# Patient Record
Sex: FEMALE | Race: WHITE | NOT HISPANIC OR LATINO
[De-identification: names, ages, dates, MRNs, and addresses within clinical notes are randomized per-mention and may not be internally consistent; named-entity substitution may affect disease eponyms.]

---

## 2017-02-17 ENCOUNTER — APPOINTMENT (OUTPATIENT)
Dept: RADIOLOGY | Facility: CLINIC | Age: 52
End: 2017-02-17

## 2017-02-17 ENCOUNTER — APPOINTMENT (OUTPATIENT)
Dept: ULTRASOUND IMAGING | Facility: CLINIC | Age: 52
End: 2017-02-17

## 2017-02-17 ENCOUNTER — OUTPATIENT (OUTPATIENT)
Dept: OUTPATIENT SERVICES | Facility: HOSPITAL | Age: 52
LOS: 1 days | End: 2017-02-17
Payer: COMMERCIAL

## 2017-02-17 DIAGNOSIS — E04.9 NONTOXIC GOITER, UNSPECIFIED: ICD-10-CM

## 2017-02-17 DIAGNOSIS — M85.80 OTHER SPECIFIED DISORDERS OF BONE DENSITY AND STRUCTURE, UNSPECIFIED SITE: ICD-10-CM

## 2017-02-17 PROCEDURE — 76536 US EXAM OF HEAD AND NECK: CPT

## 2017-02-17 PROCEDURE — 77080 DXA BONE DENSITY AXIAL: CPT

## 2017-02-23 ENCOUNTER — TRANSCRIPTION ENCOUNTER (OUTPATIENT)
Age: 52
End: 2017-02-23

## 2017-02-23 DIAGNOSIS — M85.9 DISORDER OF BONE DENSITY AND STRUCTURE, UNSPECIFIED: ICD-10-CM

## 2017-02-23 DIAGNOSIS — E06.3 AUTOIMMUNE THYROIDITIS: ICD-10-CM

## 2017-04-20 ENCOUNTER — RESULT REVIEW (OUTPATIENT)
Age: 52
End: 2017-04-20

## 2017-04-27 ENCOUNTER — RESULT REVIEW (OUTPATIENT)
Age: 52
End: 2017-04-27

## 2017-11-10 ENCOUNTER — OUTPATIENT (OUTPATIENT)
Dept: OUTPATIENT SERVICES | Facility: HOSPITAL | Age: 52
LOS: 1 days | End: 2017-11-10
Payer: COMMERCIAL

## 2017-11-10 ENCOUNTER — APPOINTMENT (OUTPATIENT)
Dept: MRI IMAGING | Facility: CLINIC | Age: 52
End: 2017-11-10
Payer: COMMERCIAL

## 2017-11-10 DIAGNOSIS — R42 DIZZINESS AND GIDDINESS: ICD-10-CM

## 2017-11-10 PROCEDURE — 70551 MRI BRAIN STEM W/O DYE: CPT | Mod: 26

## 2017-11-10 PROCEDURE — 70551 MRI BRAIN STEM W/O DYE: CPT

## 2018-02-22 ENCOUNTER — APPOINTMENT (OUTPATIENT)
Dept: NEUROLOGY | Facility: CLINIC | Age: 53
End: 2018-02-22
Payer: COMMERCIAL

## 2018-02-22 ENCOUNTER — FORM ENCOUNTER (OUTPATIENT)
Age: 53
End: 2018-02-22

## 2018-02-22 VITALS
WEIGHT: 150 LBS | DIASTOLIC BLOOD PRESSURE: 81 MMHG | SYSTOLIC BLOOD PRESSURE: 123 MMHG | HEART RATE: 78 BPM | BODY MASS INDEX: 26.58 KG/M2 | HEIGHT: 63 IN

## 2018-02-22 DIAGNOSIS — Z82.49 FAMILY HISTORY OF ISCHEMIC HEART DISEASE AND OTHER DISEASES OF THE CIRCULATORY SYSTEM: ICD-10-CM

## 2018-02-22 DIAGNOSIS — Z83.3 FAMILY HISTORY OF DIABETES MELLITUS: ICD-10-CM

## 2018-02-22 DIAGNOSIS — Z86.39 PERSONAL HISTORY OF OTHER ENDOCRINE, NUTRITIONAL AND METABOLIC DISEASE: ICD-10-CM

## 2018-02-22 DIAGNOSIS — Z82.5 FAMILY HISTORY OF ASTHMA AND OTHER CHRONIC LOWER RESPIRATORY DISEASES: ICD-10-CM

## 2018-02-22 DIAGNOSIS — Z78.9 OTHER SPECIFIED HEALTH STATUS: ICD-10-CM

## 2018-02-22 DIAGNOSIS — Z87.891 PERSONAL HISTORY OF NICOTINE DEPENDENCE: ICD-10-CM

## 2018-02-22 DIAGNOSIS — G57.50 TARSAL TUNNEL SYNDROME, UNSPECIFIED LOWER LIMB: ICD-10-CM

## 2018-02-22 DIAGNOSIS — G56.03 CARPAL TUNNEL SYNDROM,BILATERAL UPPER LIMBS: ICD-10-CM

## 2018-02-22 PROCEDURE — 99204 OFFICE O/P NEW MOD 45 MIN: CPT

## 2018-02-22 RX ORDER — UBIDECARENONE/VIT E ACET 100MG-5
25 MCG CAPSULE ORAL
Refills: 0 | Status: ACTIVE | COMMUNITY

## 2018-02-23 ENCOUNTER — OUTPATIENT (OUTPATIENT)
Dept: OUTPATIENT SERVICES | Facility: HOSPITAL | Age: 53
LOS: 1 days | End: 2018-02-23
Payer: COMMERCIAL

## 2018-02-23 ENCOUNTER — APPOINTMENT (OUTPATIENT)
Dept: MRI IMAGING | Facility: CLINIC | Age: 53
End: 2018-02-23
Payer: COMMERCIAL

## 2018-02-23 DIAGNOSIS — Z00.8 ENCOUNTER FOR OTHER GENERAL EXAMINATION: ICD-10-CM

## 2018-02-23 PROCEDURE — 72141 MRI NECK SPINE W/O DYE: CPT | Mod: 26

## 2018-02-23 PROCEDURE — 72141 MRI NECK SPINE W/O DYE: CPT

## 2018-03-01 LAB
CRP SERPL-MCNC: 0.5 MG/DL
THYROGLOB AB SERPL-ACNC: <20 IU/ML
THYROPEROXIDASE AB SERPL IA-ACNC: 77.5 IU/ML

## 2018-04-13 ENCOUNTER — APPOINTMENT (OUTPATIENT)
Dept: OPHTHALMOLOGY | Facility: CLINIC | Age: 53
End: 2018-04-13
Payer: COMMERCIAL

## 2018-04-13 DIAGNOSIS — Z78.9 OTHER SPECIFIED HEALTH STATUS: ICD-10-CM

## 2018-04-13 DIAGNOSIS — Z83.518 FAMILY HISTORY OF OTHER SPECIFIED EYE DISORDER: ICD-10-CM

## 2018-04-13 DIAGNOSIS — G43.109 MIGRAINE WITH AURA, NOT INTRACTABLE, W/OUT STATUS MIGRAINOSUS: ICD-10-CM

## 2018-04-13 PROCEDURE — 99204 OFFICE O/P NEW MOD 45 MIN: CPT

## 2018-04-13 PROCEDURE — 92083 EXTENDED VISUAL FIELD XM: CPT

## 2018-04-20 ENCOUNTER — APPOINTMENT (OUTPATIENT)
Dept: NEUROLOGY | Facility: CLINIC | Age: 53
End: 2018-04-20
Payer: COMMERCIAL

## 2018-04-20 DIAGNOSIS — R20.2 PARESTHESIA OF SKIN: ICD-10-CM

## 2018-04-20 PROCEDURE — 95911 NRV CNDJ TEST 9-10 STUDIES: CPT

## 2018-04-20 PROCEDURE — 95886 MUSC TEST DONE W/N TEST COMP: CPT

## 2018-05-02 ENCOUNTER — RESULT REVIEW (OUTPATIENT)
Age: 53
End: 2018-05-02

## 2018-05-10 ENCOUNTER — APPOINTMENT (OUTPATIENT)
Dept: NEUROLOGY | Facility: CLINIC | Age: 53
End: 2018-05-10

## 2018-06-01 ENCOUNTER — APPOINTMENT (OUTPATIENT)
Dept: OTOLARYNGOLOGY | Facility: CLINIC | Age: 53
End: 2018-06-01
Payer: COMMERCIAL

## 2018-06-01 VITALS
DIASTOLIC BLOOD PRESSURE: 70 MMHG | WEIGHT: 147 LBS | BODY MASS INDEX: 26.05 KG/M2 | HEART RATE: 81 BPM | HEIGHT: 63 IN | SYSTOLIC BLOOD PRESSURE: 112 MMHG

## 2018-06-01 DIAGNOSIS — G43.909 MIGRAINE, UNSPECIFIED, NOT INTRACTABLE, W/OUT STATUS MIGRAINOSUS: ICD-10-CM

## 2018-06-01 PROCEDURE — 92550 TYMPANOMETRY & REFLEX THRESH: CPT

## 2018-06-01 PROCEDURE — 92557 COMPREHENSIVE HEARING TEST: CPT

## 2018-06-01 PROCEDURE — 99204 OFFICE O/P NEW MOD 45 MIN: CPT | Mod: 25

## 2018-06-22 ENCOUNTER — OUTPATIENT (OUTPATIENT)
Dept: OUTPATIENT SERVICES | Facility: HOSPITAL | Age: 53
LOS: 1 days | End: 2018-06-22
Payer: COMMERCIAL

## 2018-06-22 VITALS
RESPIRATION RATE: 16 BRPM | HEART RATE: 82 BPM | OXYGEN SATURATION: 99 % | TEMPERATURE: 98 F | HEIGHT: 64.5 IN | DIASTOLIC BLOOD PRESSURE: 86 MMHG | WEIGHT: 147.05 LBS | SYSTOLIC BLOOD PRESSURE: 120 MMHG

## 2018-06-22 DIAGNOSIS — Z98.890 OTHER SPECIFIED POSTPROCEDURAL STATES: Chronic | ICD-10-CM

## 2018-06-22 DIAGNOSIS — E03.9 HYPOTHYROIDISM, UNSPECIFIED: ICD-10-CM

## 2018-06-22 DIAGNOSIS — J45.909 UNSPECIFIED ASTHMA, UNCOMPLICATED: ICD-10-CM

## 2018-06-22 DIAGNOSIS — H80.93 UNSPECIFIED OTOSCLEROSIS, BILATERAL: ICD-10-CM

## 2018-06-22 DIAGNOSIS — Z86.69 PERSONAL HISTORY OF OTHER DISEASES OF THE NERVOUS SYSTEM AND SENSE ORGANS: Chronic | ICD-10-CM

## 2018-06-22 LAB
BUN SERPL-MCNC: 13 MG/DL — SIGNIFICANT CHANGE UP (ref 7–23)
CALCIUM SERPL-MCNC: 9.2 MG/DL — SIGNIFICANT CHANGE UP (ref 8.4–10.5)
CHLORIDE SERPL-SCNC: 101 MMOL/L — SIGNIFICANT CHANGE UP (ref 98–107)
CO2 SERPL-SCNC: 26 MMOL/L — SIGNIFICANT CHANGE UP (ref 22–31)
CREAT SERPL-MCNC: 0.74 MG/DL — SIGNIFICANT CHANGE UP (ref 0.5–1.3)
GLUCOSE SERPL-MCNC: 71 MG/DL — SIGNIFICANT CHANGE UP (ref 70–99)
HCT VFR BLD CALC: 43.3 % — SIGNIFICANT CHANGE UP (ref 34.5–45)
HGB BLD-MCNC: 14.8 G/DL — SIGNIFICANT CHANGE UP (ref 11.5–15.5)
MCHC RBC-ENTMCNC: 34.2 % — SIGNIFICANT CHANGE UP (ref 32–36)
MCHC RBC-ENTMCNC: 34.3 PG — HIGH (ref 27–34)
MCV RBC AUTO: 100.2 FL — HIGH (ref 80–100)
NRBC # FLD: 0 — SIGNIFICANT CHANGE UP
PLATELET # BLD AUTO: 185 K/UL — SIGNIFICANT CHANGE UP (ref 150–400)
PMV BLD: 9.7 FL — SIGNIFICANT CHANGE UP (ref 7–13)
POTASSIUM SERPL-MCNC: 4 MMOL/L — SIGNIFICANT CHANGE UP (ref 3.5–5.3)
POTASSIUM SERPL-SCNC: 4 MMOL/L — SIGNIFICANT CHANGE UP (ref 3.5–5.3)
RBC # BLD: 4.32 M/UL — SIGNIFICANT CHANGE UP (ref 3.8–5.2)
RBC # FLD: 12.6 % — SIGNIFICANT CHANGE UP (ref 10.3–14.5)
SODIUM SERPL-SCNC: 139 MMOL/L — SIGNIFICANT CHANGE UP (ref 135–145)
WBC # BLD: 3.82 K/UL — SIGNIFICANT CHANGE UP (ref 3.8–10.5)
WBC # FLD AUTO: 3.82 K/UL — SIGNIFICANT CHANGE UP (ref 3.8–10.5)

## 2018-06-22 PROCEDURE — 93010 ELECTROCARDIOGRAM REPORT: CPT

## 2018-06-22 NOTE — H&P PST ADULT - LYMPHATIC
posterior cervical L/anterior cervical L/supraclavicular R/posterior cervical R/anterior cervical R/supraclavicular L

## 2018-06-22 NOTE — H&P PST ADULT - FAMILY HISTORY
Mother  Still living? No  Family history of diabetes mellitus, Age at diagnosis: Age Unknown  Family history of aortic stenosis, Age at diagnosis: Age Unknown  Family history of arrhythmia, Age at diagnosis: Age Unknown  Family history of pacemaker, Age at diagnosis: Age Unknown     Father  Still living? No  Family history of myocardial infarction, Age at diagnosis: Age Unknown  Family history of cerebrovascular accident (CVA), Age at diagnosis: Age Unknown     Sibling  Still living? Yes, Estimated age: 61-70  Family history of breast cancer, Age at diagnosis: Age Unknown  Family history of melanoma, Age at diagnosis: Age Unknown

## 2018-06-22 NOTE — H&P PST ADULT - PROBLEM SELECTOR PLAN 3
lungs clear , O2 sat 99 % on room air . Pt instructed to take inhaler as prescribed and bring to hospital .

## 2018-06-22 NOTE — H&P PST ADULT - RS GEN PE MLT RESP DETAILS PC
respirations non-labored/good air movement/no rales/no wheezes/clear to auscultation bilaterally/no rhonchi/breath sounds equal

## 2018-06-22 NOTE — H&P PST ADULT - HISTORY OF PRESENT ILLNESS
This is  a 53 y.o. female who presented to Dr Tariq complaining of being hard of hearing  , hearing test done . Pt has unspecified otosclerosis , bilateral . Pt now for surgery .

## 2018-06-22 NOTE — H&P PST ADULT - MUSCULOSKELETAL
detailed exam no joint warmth/no calf tenderness/ROM intact/no joint swelling/no joint erythema details…

## 2018-06-22 NOTE — H&P PST ADULT - ATTENDING COMMENTS
Physician Assistant Statement 07-20-18  I have personally seen and interviewed the patient. There have NOT been any changes in the patient's history or review of systems since PMD visit on 7/13/18.

## 2018-06-22 NOTE — H&P PST ADULT - NSANTHOSAYNRD_GEN_A_CORE
No. BRISSA screening performed.  STOP BANG Legend: 0-2 = LOW Risk; 3-4 = INTERMEDIATE Risk; 5-8 = HIGH Risk

## 2018-07-02 ENCOUNTER — TRANSCRIPTION ENCOUNTER (OUTPATIENT)
Age: 53
End: 2018-07-02

## 2018-07-03 ENCOUNTER — APPOINTMENT (OUTPATIENT)
Dept: OTOLARYNGOLOGY | Facility: AMBULATORY SURGERY CENTER | Age: 53
End: 2018-07-03

## 2018-07-03 ENCOUNTER — RESULT REVIEW (OUTPATIENT)
Age: 53
End: 2018-07-03

## 2018-07-03 ENCOUNTER — OUTPATIENT (OUTPATIENT)
Dept: OUTPATIENT SERVICES | Facility: HOSPITAL | Age: 53
LOS: 1 days | Discharge: HOME | End: 2018-07-03
Payer: COMMERCIAL

## 2018-07-03 VITALS
RESPIRATION RATE: 16 BRPM | OXYGEN SATURATION: 98 % | HEART RATE: 72 BPM | TEMPERATURE: 98 F | DIASTOLIC BLOOD PRESSURE: 42 MMHG | SYSTOLIC BLOOD PRESSURE: 111 MMHG | HEIGHT: 64.5 IN | WEIGHT: 147.05 LBS

## 2018-07-03 VITALS — RESPIRATION RATE: 14 BRPM | TEMPERATURE: 98 F | OXYGEN SATURATION: 99 % | HEART RATE: 82 BPM

## 2018-07-03 DIAGNOSIS — H80.93 UNSPECIFIED OTOSCLEROSIS, BILATERAL: ICD-10-CM

## 2018-07-03 DIAGNOSIS — Z86.69 PERSONAL HISTORY OF OTHER DISEASES OF THE NERVOUS SYSTEM AND SENSE ORGANS: Chronic | ICD-10-CM

## 2018-07-03 DIAGNOSIS — Z98.890 OTHER SPECIFIED POSTPROCEDURAL STATES: Chronic | ICD-10-CM

## 2018-07-03 PROCEDURE — 88304 TISSUE EXAM BY PATHOLOGIST: CPT | Mod: 26

## 2018-07-03 PROCEDURE — 88311 DECALCIFY TISSUE: CPT | Mod: 26

## 2018-07-03 PROCEDURE — 20926: CPT | Mod: LT

## 2018-07-03 PROCEDURE — 69660 REVISE MIDDLE EAR BONE: CPT | Mod: LT

## 2018-07-03 NOTE — ASU DISCHARGE PLAN (ADULT/PEDIATRIC). - NOTIFY
Bleeding that does not stop Bleeding that does not stop/Persistent Nausea and Vomiting/Fever greater than 101/Inability to Tolerate Liquids or Foods

## 2018-07-05 ENCOUNTER — MESSAGE (OUTPATIENT)
Age: 53
End: 2018-07-05

## 2018-07-09 LAB — SURGICAL PATHOLOGY STUDY: SIGNIFICANT CHANGE UP

## 2018-07-10 ENCOUNTER — OUTPATIENT (OUTPATIENT)
Dept: OUTPATIENT SERVICES | Facility: HOSPITAL | Age: 53
LOS: 1 days | End: 2018-07-10
Payer: COMMERCIAL

## 2018-07-10 VITALS
TEMPERATURE: 98 F | HEART RATE: 77 BPM | SYSTOLIC BLOOD PRESSURE: 100 MMHG | OXYGEN SATURATION: 98 % | HEIGHT: 64 IN | WEIGHT: 142.42 LBS | RESPIRATION RATE: 16 BRPM | DIASTOLIC BLOOD PRESSURE: 61 MMHG

## 2018-07-10 VITALS — HEIGHT: 64 IN | WEIGHT: 142.42 LBS

## 2018-07-10 DIAGNOSIS — D48.1 NEOPLASM OF UNCERTAIN BEHAVIOR OF CONNECTIVE AND OTHER SOFT TISSUE: ICD-10-CM

## 2018-07-10 DIAGNOSIS — Z01.818 ENCOUNTER FOR OTHER PREPROCEDURAL EXAMINATION: ICD-10-CM

## 2018-07-10 DIAGNOSIS — M24.574 CONTRACTURE, RIGHT FOOT: ICD-10-CM

## 2018-07-10 DIAGNOSIS — Z98.890 OTHER SPECIFIED POSTPROCEDURAL STATES: Chronic | ICD-10-CM

## 2018-07-10 DIAGNOSIS — M21.961 UNSPECIFIED ACQUIRED DEFORMITY OF RIGHT LOWER LEG: ICD-10-CM

## 2018-07-10 DIAGNOSIS — M20.11 HALLUX VALGUS (ACQUIRED), RIGHT FOOT: ICD-10-CM

## 2018-07-10 DIAGNOSIS — M20.5X1 OTHER DEFORMITIES OF TOE(S) (ACQUIRED), RIGHT FOOT: ICD-10-CM

## 2018-07-10 DIAGNOSIS — Z86.69 PERSONAL HISTORY OF OTHER DISEASES OF THE NERVOUS SYSTEM AND SENSE ORGANS: Chronic | ICD-10-CM

## 2018-07-10 PROCEDURE — G0463: CPT

## 2018-07-10 RX ORDER — SODIUM CHLORIDE 9 MG/ML
1000 INJECTION, SOLUTION INTRAVENOUS
Qty: 0 | Refills: 0 | Status: DISCONTINUED | OUTPATIENT
Start: 2018-07-20 | End: 2018-07-20

## 2018-07-10 NOTE — H&P PST ADULT - PSH
H/O ear disorder  right  stapedectomy - 3/93  S/P ear surgery  Stapedectomy 7/3/18  S/P left knee arthroscopy  7/09

## 2018-07-10 NOTE — H&P PST ADULT - PMH
Asthma    Deformity of foot, right    Hiatal hernia    Hypothyroid  last TFT's 6 weeks ago - normal  Otosclerosis of both ears    Vertigo

## 2018-07-10 NOTE — H&P PST ADULT - ATTENDING COMMENTS
I have discussed review of systems and patient's history and there have been no changes in medical status since seen by PMD.  documented by Patricia Sanchez R-PAC on 7/20/18  at approx 12: 15 pm

## 2018-07-10 NOTE — H&P PST ADULT - RS GEN PE MLT RESP DETAILS PC
clear to auscultation bilaterally/good air movement/airway patent/breath sounds equal/normal/respirations non-labored

## 2018-07-10 NOTE — H&P PST ADULT - HISTORY OF PRESENT ILLNESS
54 y/o female presents for PST. As per patient she developed deformities to right foot about 3 yrs ago but over the past one year it has been getting progressively worst causing pain and discomfort with ambulation and footwear. Pain and discomfort is relieved by resting.

## 2018-07-10 NOTE — H&P PST ADULT - FAMILY HISTORY
Mother  Still living? No  Family history of diabetes mellitus, Age at diagnosis: Age Unknown  Family history of aortic stenosis, Age at diagnosis: Age Unknown  Family history of arrhythmia, Age at diagnosis: Age Unknown  Family history of pacemaker, Age at diagnosis: Age Unknown     Father  Still living? No  Family history of myocardial infarction, Age at diagnosis: Age Unknown  Family history of cerebrovascular accident (CVA), Age at diagnosis: Age Unknown  Family history of Parkinson disease, Age at diagnosis: Age Unknown     Sibling  Still living? Yes, Estimated age: 61-70  Family history of breast cancer, Age at diagnosis: Age Unknown  Family history of melanoma, Age at diagnosis: Age Unknown

## 2018-07-13 ENCOUNTER — APPOINTMENT (OUTPATIENT)
Dept: OTOLARYNGOLOGY | Facility: CLINIC | Age: 53
End: 2018-07-13
Payer: COMMERCIAL

## 2018-07-13 VITALS
SYSTOLIC BLOOD PRESSURE: 105 MMHG | HEIGHT: 63 IN | HEART RATE: 70 BPM | DIASTOLIC BLOOD PRESSURE: 65 MMHG | WEIGHT: 141 LBS | BODY MASS INDEX: 24.98 KG/M2

## 2018-07-13 PROCEDURE — 99024 POSTOP FOLLOW-UP VISIT: CPT

## 2018-07-19 ENCOUNTER — TRANSCRIPTION ENCOUNTER (OUTPATIENT)
Age: 53
End: 2018-07-19

## 2018-07-20 ENCOUNTER — RESULT REVIEW (OUTPATIENT)
Age: 53
End: 2018-07-20

## 2018-07-20 ENCOUNTER — OUTPATIENT (OUTPATIENT)
Dept: OUTPATIENT SERVICES | Facility: HOSPITAL | Age: 53
LOS: 1 days | End: 2018-07-20
Payer: COMMERCIAL

## 2018-07-20 VITALS
SYSTOLIC BLOOD PRESSURE: 112 MMHG | OXYGEN SATURATION: 99 % | TEMPERATURE: 98 F | DIASTOLIC BLOOD PRESSURE: 74 MMHG | HEART RATE: 65 BPM | RESPIRATION RATE: 14 BRPM

## 2018-07-20 VITALS
HEART RATE: 89 BPM | HEIGHT: 64 IN | DIASTOLIC BLOOD PRESSURE: 74 MMHG | OXYGEN SATURATION: 97 % | TEMPERATURE: 98 F | SYSTOLIC BLOOD PRESSURE: 108 MMHG | RESPIRATION RATE: 14 BRPM | WEIGHT: 142.42 LBS

## 2018-07-20 DIAGNOSIS — M24.574 CONTRACTURE, RIGHT FOOT: ICD-10-CM

## 2018-07-20 DIAGNOSIS — Z98.890 OTHER SPECIFIED POSTPROCEDURAL STATES: Chronic | ICD-10-CM

## 2018-07-20 DIAGNOSIS — M20.41 OTHER HAMMER TOE(S) (ACQUIRED), RIGHT FOOT: ICD-10-CM

## 2018-07-20 DIAGNOSIS — D48.1 NEOPLASM OF UNCERTAIN BEHAVIOR OF CONNECTIVE AND OTHER SOFT TISSUE: ICD-10-CM

## 2018-07-20 DIAGNOSIS — Z86.69 PERSONAL HISTORY OF OTHER DISEASES OF THE NERVOUS SYSTEM AND SENSE ORGANS: Chronic | ICD-10-CM

## 2018-07-20 DIAGNOSIS — M20.11 HALLUX VALGUS (ACQUIRED), RIGHT FOOT: ICD-10-CM

## 2018-07-20 DIAGNOSIS — M20.5X1 OTHER DEFORMITIES OF TOE(S) (ACQUIRED), RIGHT FOOT: ICD-10-CM

## 2018-07-20 PROCEDURE — 73620 X-RAY EXAM OF FOOT: CPT

## 2018-07-20 PROCEDURE — 88311 DECALCIFY TISSUE: CPT | Mod: 26

## 2018-07-20 PROCEDURE — 88305 TISSUE EXAM BY PATHOLOGIST: CPT | Mod: 26

## 2018-07-20 PROCEDURE — 88305 TISSUE EXAM BY PATHOLOGIST: CPT

## 2018-07-20 PROCEDURE — 28299 COR HLX VLGS DOUBLE OSTEOT: CPT | Mod: RT

## 2018-07-20 PROCEDURE — 11420 EXC H-F-NK-SP B9+MARG 0.5/<: CPT | Mod: XS

## 2018-07-20 PROCEDURE — 88304 TISSUE EXAM BY PATHOLOGIST: CPT

## 2018-07-20 PROCEDURE — 73620 X-RAY EXAM OF FOOT: CPT | Mod: 26,RT

## 2018-07-20 PROCEDURE — 28272 RELEASE OF TOE JOINT EACH: CPT | Mod: T9

## 2018-07-20 PROCEDURE — 28270 RELEASE OF FOOT CONTRACTURE: CPT | Mod: XS,RT

## 2018-07-20 PROCEDURE — 88304 TISSUE EXAM BY PATHOLOGIST: CPT | Mod: 26

## 2018-07-20 PROCEDURE — 88311 DECALCIFY TISSUE: CPT

## 2018-07-20 PROCEDURE — C1713: CPT

## 2018-07-20 RX ORDER — OXYCODONE HYDROCHLORIDE 5 MG/1
5 TABLET ORAL EVERY 4 HOURS
Qty: 0 | Refills: 0 | Status: DISCONTINUED | OUTPATIENT
Start: 2018-07-20 | End: 2018-07-20

## 2018-07-20 RX ORDER — SODIUM CHLORIDE 9 MG/ML
1000 INJECTION, SOLUTION INTRAVENOUS
Qty: 0 | Refills: 0 | Status: DISCONTINUED | OUTPATIENT
Start: 2018-07-20 | End: 2018-07-20

## 2018-07-20 RX ORDER — ONDANSETRON 8 MG/1
4 TABLET, FILM COATED ORAL ONCE
Qty: 0 | Refills: 0 | Status: DISCONTINUED | OUTPATIENT
Start: 2018-07-20 | End: 2018-07-20

## 2018-07-20 RX ORDER — HYDROMORPHONE HYDROCHLORIDE 2 MG/ML
0.5 INJECTION INTRAMUSCULAR; INTRAVENOUS; SUBCUTANEOUS
Qty: 0 | Refills: 0 | Status: DISCONTINUED | OUTPATIENT
Start: 2018-07-20 | End: 2018-07-20

## 2018-07-20 RX ADMIN — SODIUM CHLORIDE 50 MILLILITER(S): 9 INJECTION, SOLUTION INTRAVENOUS at 10:38

## 2018-07-20 NOTE — BRIEF OPERATIVE NOTE - POST-OP DX
Hallux abducto valgus, right  07/20/2018  HAV/HT 2-5 right foot  STM right foot  Active  Dino Edwrads

## 2018-07-20 NOTE — ASU DISCHARGE PLAN (ADULT/PEDIATRIC). - CONDITIONS AT DISCHARGE
Pt stable.  Right foot drsg intact .  Surgical shoe in place.  Keep foot elevated Pt stable.  Right foot drsg intact .  Surgical shoe in place.

## 2018-07-20 NOTE — ASU DISCHARGE PLAN (ADULT/PEDIATRIC). - MEDICATION SUMMARY - MEDICATIONS TO TAKE
I will START or STAY ON the medications listed below when I get home from the hospital:    meclizine 30 mg oral tablet  -- orally 1 to 4 times a day, As Needed for vertigo  -- Indication: For As per physician    Ventolin HFA 90 mcg/inh inhalation aerosol  -- 2 puff(s) inhaled 4 times a day  -- Indication: For As per physician    Synthroid 88 mcg (0.088 mg) oral tablet  -- 1 tab(s) by mouth once a day  -- Indication: For As per physician    Vitamin B-12 500 mcg oral tablet  -- 1 tab(s) by mouth once a day  -- Indication: For As per physician    Vitamin D3 1000 intl units oral tablet  -- 1 tab(s) by mouth once a day  -- Indication: For As per physician

## 2018-07-20 NOTE — ASU DISCHARGE PLAN (ADULT/PEDIATRIC). - NOTIFY
Bleeding that does not stop/Pain not relieved by Medications/Fever greater than 101/Persistent Nausea and Vomiting

## 2018-07-20 NOTE — ASU PATIENT PROFILE, ADULT - ABILITY TO HEAR (WITH HEARING AID OR HEARING APPLIANCE IF NORMALLY USED):
Mildly to Moderately Impaired: difficulty hearing in some environments or speaker may need to increase volume or speak distinctly/Left ear packing

## 2018-07-20 NOTE — BRIEF OPERATIVE NOTE - PRE-OP DX
Hallux abducto valgus, right  07/20/2018  HAV / Hammertoe 2-5 right foot  STM right foot  Active  Dino Edwards

## 2018-07-25 LAB — SURGICAL PATHOLOGY STUDY: SIGNIFICANT CHANGE UP

## 2018-07-27 ENCOUNTER — TRANSCRIPTION ENCOUNTER (OUTPATIENT)
Age: 53
End: 2018-07-27

## 2018-07-31 PROBLEM — R42 DIZZINESS AND GIDDINESS: Chronic | Status: ACTIVE | Noted: 2018-07-10

## 2018-07-31 PROBLEM — M21.961 UNSPECIFIED ACQUIRED DEFORMITY OF RIGHT LOWER LEG: Chronic | Status: ACTIVE | Noted: 2018-07-10

## 2018-07-31 PROBLEM — H80.93 UNSPECIFIED OTOSCLEROSIS, BILATERAL: Chronic | Status: ACTIVE | Noted: 2018-07-10

## 2018-07-31 PROBLEM — K44.9 DIAPHRAGMATIC HERNIA WITHOUT OBSTRUCTION OR GANGRENE: Chronic | Status: ACTIVE | Noted: 2018-06-22

## 2018-07-31 PROBLEM — E03.9 HYPOTHYROIDISM, UNSPECIFIED: Chronic | Status: ACTIVE | Noted: 2018-06-22

## 2018-07-31 PROBLEM — J45.909 UNSPECIFIED ASTHMA, UNCOMPLICATED: Chronic | Status: ACTIVE | Noted: 2018-06-22

## 2018-08-01 ENCOUNTER — APPOINTMENT (OUTPATIENT)
Dept: ULTRASOUND IMAGING | Facility: CLINIC | Age: 53
End: 2018-08-01
Payer: COMMERCIAL

## 2018-08-01 ENCOUNTER — OUTPATIENT (OUTPATIENT)
Dept: OUTPATIENT SERVICES | Facility: HOSPITAL | Age: 53
LOS: 1 days | End: 2018-08-01
Payer: COMMERCIAL

## 2018-08-01 ENCOUNTER — APPOINTMENT (OUTPATIENT)
Dept: MAMMOGRAPHY | Facility: CLINIC | Age: 53
End: 2018-08-01
Payer: COMMERCIAL

## 2018-08-01 DIAGNOSIS — Z00.8 ENCOUNTER FOR OTHER GENERAL EXAMINATION: ICD-10-CM

## 2018-08-01 DIAGNOSIS — Z86.69 PERSONAL HISTORY OF OTHER DISEASES OF THE NERVOUS SYSTEM AND SENSE ORGANS: Chronic | ICD-10-CM

## 2018-08-01 DIAGNOSIS — Z98.890 OTHER SPECIFIED POSTPROCEDURAL STATES: Chronic | ICD-10-CM

## 2018-08-01 DIAGNOSIS — R92.2 INCONCLUSIVE MAMMOGRAM: ICD-10-CM

## 2018-08-01 DIAGNOSIS — Z12.31 ENCOUNTER FOR SCREENING MAMMOGRAM FOR MALIGNANT NEOPLASM OF BREAST: ICD-10-CM

## 2018-08-01 PROCEDURE — 77063 BREAST TOMOSYNTHESIS BI: CPT

## 2018-08-01 PROCEDURE — 77067 SCR MAMMO BI INCL CAD: CPT | Mod: 26

## 2018-08-01 PROCEDURE — 76641 ULTRASOUND BREAST COMPLETE: CPT | Mod: 26,50

## 2018-08-01 PROCEDURE — 77063 BREAST TOMOSYNTHESIS BI: CPT | Mod: 26

## 2018-08-01 PROCEDURE — 76641 ULTRASOUND BREAST COMPLETE: CPT

## 2018-08-01 PROCEDURE — 77067 SCR MAMMO BI INCL CAD: CPT

## 2018-08-17 ENCOUNTER — APPOINTMENT (OUTPATIENT)
Dept: NEUROLOGY | Facility: CLINIC | Age: 53
End: 2018-08-17

## 2018-08-17 ENCOUNTER — APPOINTMENT (OUTPATIENT)
Dept: OTOLARYNGOLOGY | Facility: CLINIC | Age: 53
End: 2018-08-17
Payer: COMMERCIAL

## 2018-08-17 VITALS
HEIGHT: 63 IN | HEART RATE: 78 BPM | SYSTOLIC BLOOD PRESSURE: 113 MMHG | WEIGHT: 141 LBS | DIASTOLIC BLOOD PRESSURE: 73 MMHG | BODY MASS INDEX: 24.98 KG/M2

## 2018-08-17 PROCEDURE — 92557 COMPREHENSIVE HEARING TEST: CPT

## 2018-08-17 PROCEDURE — 99024 POSTOP FOLLOW-UP VISIT: CPT

## 2018-11-16 ENCOUNTER — APPOINTMENT (OUTPATIENT)
Dept: OTOLARYNGOLOGY | Facility: CLINIC | Age: 53
End: 2018-11-16
Payer: COMMERCIAL

## 2018-11-16 VITALS
HEIGHT: 63 IN | HEART RATE: 79 BPM | WEIGHT: 141 LBS | SYSTOLIC BLOOD PRESSURE: 111 MMHG | DIASTOLIC BLOOD PRESSURE: 69 MMHG | BODY MASS INDEX: 24.98 KG/M2

## 2018-11-16 PROCEDURE — 92567 TYMPANOMETRY: CPT

## 2018-11-16 PROCEDURE — 92557 COMPREHENSIVE HEARING TEST: CPT

## 2018-11-16 PROCEDURE — G0268 REMOVAL OF IMPACTED WAX MD: CPT

## 2018-11-16 PROCEDURE — 99213 OFFICE O/P EST LOW 20 MIN: CPT | Mod: 25

## 2018-12-07 ENCOUNTER — APPOINTMENT (OUTPATIENT)
Dept: RHEUMATOLOGY | Facility: CLINIC | Age: 53
End: 2018-12-07
Payer: COMMERCIAL

## 2018-12-07 VITALS
SYSTOLIC BLOOD PRESSURE: 117 MMHG | TEMPERATURE: 97.8 F | DIASTOLIC BLOOD PRESSURE: 77 MMHG | OXYGEN SATURATION: 98 % | HEART RATE: 77 BPM | HEIGHT: 63 IN | WEIGHT: 145 LBS | RESPIRATION RATE: 16 BRPM | BODY MASS INDEX: 25.69 KG/M2

## 2018-12-07 PROCEDURE — 99204 OFFICE O/P NEW MOD 45 MIN: CPT

## 2018-12-14 ENCOUNTER — TRANSCRIPTION ENCOUNTER (OUTPATIENT)
Age: 53
End: 2018-12-14

## 2018-12-14 LAB
25(OH)D3 SERPL-MCNC: 52.2 NG/ML
ALBUMIN SERPL ELPH-MCNC: 4.6 G/DL
ALP BLD-CCNC: 75 U/L
ALT SERPL-CCNC: 22 U/L
ANA PAT FLD IF-IMP: ABNORMAL
ANA SER IF-ACNC: ABNORMAL
ANION GAP SERPL CALC-SCNC: 12 MMOL/L
APPEARANCE: CLEAR
APTT BLD: 32 SEC
AST SERPL-CCNC: 17 U/L
B2 GLYCOPROT1 AB SER QL: NEGATIVE
BACTERIA: NEGATIVE
BASOPHILS # BLD AUTO: 0.01 K/UL
BASOPHILS NFR BLD AUTO: 0.3 %
BILIRUB SERPL-MCNC: 0.4 MG/DL
BILIRUBIN URINE: NEGATIVE
BLOOD URINE: NEGATIVE
BUN SERPL-MCNC: 13 MG/DL
C3 SERPL-MCNC: 196 MG/DL
C4 SERPL-MCNC: 30 MG/DL
CALCIUM SERPL-MCNC: 9.4 MG/DL
CARDIOLIPIN AB SER IA-ACNC: NEGATIVE
CCP AB SER IA-ACNC: <8 UNITS
CENTROMERE IGG SER-ACNC: <0.2 AL
CHLORIDE SERPL-SCNC: 105 MMOL/L
CO2 SERPL-SCNC: 24 MMOL/L
COLOR: YELLOW
CREAT SERPL-MCNC: 0.73 MG/DL
CREAT SPEC-SCNC: 94 MG/DL
CREAT/PROT UR: 0.1 RATIO
CRP SERPL-MCNC: 0.43 MG/DL
DSDNA AB SER-ACNC: 20 IU/ML
ENA RNP AB SER IA-ACNC: 0.5 AL
ENA SCL70 IGG SER IA-ACNC: <0.2 AL
ENA SM AB SER IA-ACNC: <0.2 AL
ENA SS-A AB SER IA-ACNC: <0.2 AL
ENA SS-B AB SER IA-ACNC: <0.2 AL
EOSINOPHIL # BLD AUTO: 0.06 K/UL
EOSINOPHIL NFR BLD AUTO: 1.9 %
ERYTHROCYTE [SEDIMENTATION RATE] IN BLOOD BY WESTERGREN METHOD: 17 MM/HR
FOLATE SERPL-MCNC: 15.9 NG/ML
GLUCOSE QUALITATIVE U: NEGATIVE MG/DL
GLUCOSE SERPL-MCNC: 87 MG/DL
HCT VFR BLD CALC: 42.4 %
HGB BLD-MCNC: 14.4 G/DL
HYALINE CASTS: 4 /LPF
IMM GRANULOCYTES NFR BLD AUTO: 0 %
KETONES URINE: NEGATIVE
LEUKOCYTE ESTERASE URINE: NEGATIVE
LYMPHOCYTES # BLD AUTO: 1.26 K/UL
LYMPHOCYTES NFR BLD AUTO: 40.6 %
MAN DIFF?: NORMAL
MCHC RBC-ENTMCNC: 33.6 PG
MCHC RBC-ENTMCNC: 34 GM/DL
MCV RBC AUTO: 98.8 FL
MICROSCOPIC-UA: NORMAL
MONOCYTES # BLD AUTO: 0.19 K/UL
MONOCYTES NFR BLD AUTO: 6.1 %
NEUTROPHILS # BLD AUTO: 1.58 K/UL
NEUTROPHILS NFR BLD AUTO: 51.1 %
NITRITE URINE: NEGATIVE
PH URINE: 5.5
PLATELET # BLD AUTO: 175 K/UL
POTASSIUM SERPL-SCNC: 4.6 MMOL/L
PROT SERPL-MCNC: 7.4 G/DL
PROT UR-MCNC: 6 MG/DL
PROTEIN URINE: NEGATIVE MG/DL
RBC # BLD: 4.29 M/UL
RBC # FLD: 13.1 %
RED BLOOD CELLS URINE: 2 /HPF
RF+CCP IGG SER-IMP: NEGATIVE
RHEUMATOID FACT SER QL: <10 IU/ML
RNA POLYMERASE III IGG: 21.9 U
SODIUM SERPL-SCNC: 140 MMOL/L
SPECIFIC GRAVITY URINE: 1.02
SQUAMOUS EPITHELIAL CELLS: 3 /HPF
THYROGLOB AB SERPL-ACNC: <20 IU/ML
THYROPEROXIDASE AB SERPL IA-ACNC: 107 IU/ML
TSH SERPL-ACNC: 4.93 UIU/ML
UROBILINOGEN URINE: NEGATIVE MG/DL
VIT B12 SERPL-MCNC: 828 PG/ML
WBC # FLD AUTO: 3.1 K/UL
WHITE BLOOD CELLS URINE: 1 /HPF

## 2019-01-04 ENCOUNTER — TRANSCRIPTION ENCOUNTER (OUTPATIENT)
Age: 54
End: 2019-01-04

## 2019-01-07 LAB
CRP SERPL-MCNC: 0.49 MG/DL
ERYTHROCYTE [SEDIMENTATION RATE] IN BLOOD BY WESTERGREN METHOD: 8 MM/HR

## 2019-01-11 ENCOUNTER — TRANSCRIPTION ENCOUNTER (OUTPATIENT)
Age: 54
End: 2019-01-11

## 2019-01-14 ENCOUNTER — TRANSCRIPTION ENCOUNTER (OUTPATIENT)
Age: 54
End: 2019-01-14

## 2019-01-18 ENCOUNTER — TRANSCRIPTION ENCOUNTER (OUTPATIENT)
Age: 54
End: 2019-01-18

## 2019-01-22 ENCOUNTER — TRANSCRIPTION ENCOUNTER (OUTPATIENT)
Age: 54
End: 2019-01-22

## 2019-01-22 LAB — RNA POLYMERASE III IGG: <10 U

## 2019-01-23 ENCOUNTER — TRANSCRIPTION ENCOUNTER (OUTPATIENT)
Age: 54
End: 2019-01-23

## 2019-01-23 DIAGNOSIS — D70.9 NEUTROPENIA, UNSPECIFIED: ICD-10-CM

## 2019-02-22 ENCOUNTER — OUTPATIENT (OUTPATIENT)
Dept: OUTPATIENT SERVICES | Facility: HOSPITAL | Age: 54
LOS: 1 days | End: 2019-02-22
Payer: COMMERCIAL

## 2019-02-22 ENCOUNTER — APPOINTMENT (OUTPATIENT)
Dept: ULTRASOUND IMAGING | Facility: CLINIC | Age: 54
End: 2019-02-22
Payer: COMMERCIAL

## 2019-02-22 ENCOUNTER — APPOINTMENT (OUTPATIENT)
Dept: RADIOLOGY | Facility: CLINIC | Age: 54
End: 2019-02-22
Payer: COMMERCIAL

## 2019-02-22 DIAGNOSIS — Z98.890 OTHER SPECIFIED POSTPROCEDURAL STATES: Chronic | ICD-10-CM

## 2019-02-22 DIAGNOSIS — Z86.69 PERSONAL HISTORY OF OTHER DISEASES OF THE NERVOUS SYSTEM AND SENSE ORGANS: Chronic | ICD-10-CM

## 2019-02-22 DIAGNOSIS — Z00.8 ENCOUNTER FOR OTHER GENERAL EXAMINATION: ICD-10-CM

## 2019-02-22 PROCEDURE — 77080 DXA BONE DENSITY AXIAL: CPT | Mod: 26

## 2019-02-22 PROCEDURE — 76536 US EXAM OF HEAD AND NECK: CPT

## 2019-02-22 PROCEDURE — 76536 US EXAM OF HEAD AND NECK: CPT | Mod: 26

## 2019-02-22 PROCEDURE — 77080 DXA BONE DENSITY AXIAL: CPT

## 2019-04-24 ENCOUNTER — TRANSCRIPTION ENCOUNTER (OUTPATIENT)
Age: 54
End: 2019-04-24

## 2019-08-16 ENCOUNTER — APPOINTMENT (OUTPATIENT)
Dept: MAMMOGRAPHY | Facility: CLINIC | Age: 54
End: 2019-08-16
Payer: COMMERCIAL

## 2019-08-16 ENCOUNTER — OUTPATIENT (OUTPATIENT)
Dept: OUTPATIENT SERVICES | Facility: HOSPITAL | Age: 54
LOS: 1 days | End: 2019-08-16
Payer: COMMERCIAL

## 2019-08-16 ENCOUNTER — APPOINTMENT (OUTPATIENT)
Dept: ULTRASOUND IMAGING | Facility: CLINIC | Age: 54
End: 2019-08-16
Payer: COMMERCIAL

## 2019-08-16 DIAGNOSIS — Z98.890 OTHER SPECIFIED POSTPROCEDURAL STATES: Chronic | ICD-10-CM

## 2019-08-16 DIAGNOSIS — Z00.8 ENCOUNTER FOR OTHER GENERAL EXAMINATION: ICD-10-CM

## 2019-08-16 DIAGNOSIS — Z86.69 PERSONAL HISTORY OF OTHER DISEASES OF THE NERVOUS SYSTEM AND SENSE ORGANS: Chronic | ICD-10-CM

## 2019-08-16 PROCEDURE — 76641 ULTRASOUND BREAST COMPLETE: CPT | Mod: 26,50

## 2019-08-16 PROCEDURE — 77063 BREAST TOMOSYNTHESIS BI: CPT

## 2019-08-16 PROCEDURE — 77063 BREAST TOMOSYNTHESIS BI: CPT | Mod: 26

## 2019-08-16 PROCEDURE — 77067 SCR MAMMO BI INCL CAD: CPT

## 2019-08-16 PROCEDURE — 76641 ULTRASOUND BREAST COMPLETE: CPT

## 2019-08-16 PROCEDURE — 77067 SCR MAMMO BI INCL CAD: CPT | Mod: 26

## 2019-08-21 ENCOUNTER — OUTPATIENT (OUTPATIENT)
Dept: OUTPATIENT SERVICES | Facility: HOSPITAL | Age: 54
LOS: 1 days | End: 2019-08-21
Payer: COMMERCIAL

## 2019-08-21 ENCOUNTER — APPOINTMENT (OUTPATIENT)
Dept: ULTRASOUND IMAGING | Facility: CLINIC | Age: 54
End: 2019-08-21
Payer: COMMERCIAL

## 2019-08-21 DIAGNOSIS — Z98.890 OTHER SPECIFIED POSTPROCEDURAL STATES: Chronic | ICD-10-CM

## 2019-08-21 DIAGNOSIS — Z86.69 PERSONAL HISTORY OF OTHER DISEASES OF THE NERVOUS SYSTEM AND SENSE ORGANS: Chronic | ICD-10-CM

## 2019-08-21 DIAGNOSIS — Z00.8 ENCOUNTER FOR OTHER GENERAL EXAMINATION: ICD-10-CM

## 2019-08-21 PROCEDURE — 76642 ULTRASOUND BREAST LIMITED: CPT

## 2019-08-21 PROCEDURE — 76642 ULTRASOUND BREAST LIMITED: CPT | Mod: 26,RT

## 2019-10-25 ENCOUNTER — OTHER (OUTPATIENT)
Age: 54
End: 2019-10-25

## 2019-11-18 NOTE — ASU PREOP CHECKLIST - SIDE RAILS UP
n/a I have personally provided the amount of critical care time documented below excluding time spent on separate procedures

## 2020-02-25 LAB
A-TUMOR NECROSIS FACT SERPL-MCNC: <5 PG/ML
C3 SERPL-MCNC: 128 MG/DL
C4 SERPL-MCNC: 25 MG/DL
FOLATE SERPL-MCNC: 12.1 NG/ML
IL10 SERPL-MCNC: <5 PG/ML
IL12 SERPL-MCNC: <5 PG/ML
IL13 SERPL-MCNC: 5 PG/ML
IL2 SERPL-MCNC: 325 PG/ML
IL2 SERPL-MCNC: <5 PG/ML
IL4 SERPL-MCNC: <5 PG/ML
IL6 SERPL-MCNC: <5 PG/ML
IL8 SERPL-MCNC: <5 PG/ML
INTERFERON GAMMA: <5 PG/ML
INTERLEUKIN 1 BETA: <5 PG/ML
INTERLEUKIN 17: <5 PG/ML
INTERLEUKIN 5: <5 PG/ML
METHYLMALONATE SERPL-SCNC: 154 NMOL/L
VIT B12 SERPL-MCNC: 386 PG/ML

## 2020-02-27 ENCOUNTER — FORM ENCOUNTER (OUTPATIENT)
Age: 55
End: 2020-02-27

## 2020-02-28 ENCOUNTER — APPOINTMENT (OUTPATIENT)
Dept: OTOLARYNGOLOGY | Facility: CLINIC | Age: 55
End: 2020-02-28
Payer: COMMERCIAL

## 2020-02-28 ENCOUNTER — APPOINTMENT (OUTPATIENT)
Dept: CT IMAGING | Facility: CLINIC | Age: 55
End: 2020-02-28
Payer: COMMERCIAL

## 2020-02-28 ENCOUNTER — OUTPATIENT (OUTPATIENT)
Dept: OUTPATIENT SERVICES | Facility: HOSPITAL | Age: 55
LOS: 1 days | End: 2020-02-28
Payer: COMMERCIAL

## 2020-02-28 VITALS
DIASTOLIC BLOOD PRESSURE: 75 MMHG | HEIGHT: 63 IN | HEART RATE: 69 BPM | BODY MASS INDEX: 26.58 KG/M2 | SYSTOLIC BLOOD PRESSURE: 122 MMHG | WEIGHT: 150 LBS

## 2020-02-28 DIAGNOSIS — H80.93 UNSPECIFIED OTOSCLEROSIS, BILATERAL: ICD-10-CM

## 2020-02-28 DIAGNOSIS — Z98.890 OTHER SPECIFIED POSTPROCEDURAL STATES: Chronic | ICD-10-CM

## 2020-02-28 DIAGNOSIS — H90.6 MIXED CONDUCTIVE AND SENSORINEURAL HEARING LOSS, BILATERAL: ICD-10-CM

## 2020-02-28 DIAGNOSIS — J34.2 DEVIATED NASAL SEPTUM: ICD-10-CM

## 2020-02-28 DIAGNOSIS — H61.22 IMPACTED CERUMEN, LEFT EAR: ICD-10-CM

## 2020-02-28 DIAGNOSIS — Z86.69 PERSONAL HISTORY OF OTHER DISEASES OF THE NERVOUS SYSTEM AND SENSE ORGANS: Chronic | ICD-10-CM

## 2020-02-28 PROCEDURE — 92567 TYMPANOMETRY: CPT

## 2020-02-28 PROCEDURE — 31231 NASAL ENDOSCOPY DX: CPT

## 2020-02-28 PROCEDURE — 70480 CT ORBIT/EAR/FOSSA W/O DYE: CPT

## 2020-02-28 PROCEDURE — 99214 OFFICE O/P EST MOD 30 MIN: CPT | Mod: 25

## 2020-02-28 PROCEDURE — 70480 CT ORBIT/EAR/FOSSA W/O DYE: CPT | Mod: 26

## 2020-02-28 PROCEDURE — 92557 COMPREHENSIVE HEARING TEST: CPT

## 2020-03-01 NOTE — HISTORY OF PRESENT ILLNESS
[No] : patient does not have a  history of radiation therapy [Stapedectomy] : stapedectomy [Tinnitus] : tinnitus [Hearing Loss] : hearing loss [Otosclerosis] : otosclerosis [None] : No risk factors have been identified. [Nasal Congestion] : nasal congestion [de-identified] : 55 yo female\par Patient with hx of left stapedectomy 7/3/18. Complains of progressive left sided hearing loss., has got worse since last visit. When she enters a full room, hearing is muffled, almost as if she is under water. Has seen her PCP for this complaint, put her on Medrol dose pack with no benefit. Continues to have tinnitus in the right ear. \par Always has nasal congestion not on any nasal sprays.\par Pt has no ear pain, ear drainage, tinnitus, vertigo,nasal discharge, epistaxis, sinus infections, facial pain, facial pressure, throat pain, dysphagia or fevers\par \par   [Dizziness] : no dizziness [Recurrent Otitis Media] : no recurrent otitis media [Otitis Media with Effusion] : no otitis media with effusion [Congenital Ear Malformation] : no congenital ear malformation [Presbycusis] : no presbycusis [Perilymphatic Fistula] : no perilymphatic fistula [Hypertension] : no hypertension [Loud Noise Exposure] : no history of loud noise exposure [Smoking] : no smoking [Early Onset Hearing Loss] : no early onset hearing loss [Stroke] : no stroke [Facial Pain] : no facial pain [Facial Pressure] : no facial pressure [Diplopia] : no diplopia [Allergic Rhinitis] : no allergic rhinitis [Ear Fullness] : no ear fullness [Environmental Allergies] : no environmental allergies [Environmental Allergens] : no environmental allergens [Seasonal Allergies] : no seasonal allergies [Adenoidectomy] : no adenoidectomy [Allergies] : no allergies [Asthma] : no asthma [Neck Mass] : no neck mass [Chills] : no chills [Neck Pain] : no neck pain [Cough] : no cough [Cold Intolerance] : no cold intolerance [Fatigue] : no fatigue [Heat Intolerance] : no heat intolerance [Hyperthyroidism] : no hyperthyroidism [Sialadenitis] : no sialadenitis [Hodgkin Disease] : no hodgkin disease [Non-Hodgkin Lymphoma] : no non-hodgkin lymphoma [Tobacco Use] : no tobacco use [Alcohol Use] : no alcohol use

## 2020-03-01 NOTE — ASSESSMENT
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

## 2020-03-01 NOTE — PHYSICAL EXAM
[Normal] : mucosa is normal [Midline] : trachea located in midline position [Nasal Endoscopy Performed] : nasal endoscopy was performed, see procedure section for findings [] : septum deviated to the right [de-identified] : bilat wax [de-identified] : b/l congestion

## 2020-03-01 NOTE — REASON FOR VISIT
[Subsequent Evaluation] : a subsequent evaluation for [Hearing Loss] : hearing loss [FreeTextEntry2] : s/p left stapedectomy

## 2020-03-01 NOTE — PROCEDURE
[FreeTextEntry6] : Anterior Rhinoscopy insufficient to account for symptoms.\par \par After informed verbal consent is obtained, the fiberoptic nasal endoscope # 26 is passed via the right nasal cavity.\par The following anatomic sites were directly examined in a sequential fashion:\par The scope was introduced in both  nasal passages between the middle and inferior turbinates to exam the inferior portion of the middle meatus and the fontanelle, as well as the maxillary ostia.  Next, the scope was passed medially and posteriorly to the middle turbinates to examine the sphenoethmoid recess and the superior turbinate region.\par The osteomeatal complex is clear with no polyposis or purulence. The sphenoethmoidal recess is clear with no polyposis or purulence. The choana is patent. \par \par   There is [ 0 ]% obstruction of the nasopharynx with adenoid tissue.\par \par A right deviated nasal septum was noted causing obstruction.\par The turbinates were congested-bilateral.\par \par Right Side:\par ·               Mucosa: wnl\par ·               Mucous: none\par ·               Polyp: none\par ·               Inferior Turbinate: sl congested\par ·               Middle Turbinate:sl congested\par ·               Superior Turbinate: wnl\par ·               Inferior Meatus:clear\par ·               Middle Meatus: clear\par ·               Super Meatus: clear\par ·               Sphenoethmoidal Recess: wnl\par \par \par \par Left Side:\par ·               Mucosa: wnl\par ·               Mucous:none\par ·               Polyp: none\par ·               Inferior Turbinate: sl congested\par ·               Middle Turbinate: sl congested\par ·               Superior Turbinate:wnl\par ·               Inferior Meatus: clear\par ·               Middle Meatus: clear\par ·               Super Meatus:clear\par ·               Sphenoethmoidal Recess: wnl\par \par

## 2020-03-11 ENCOUNTER — APPOINTMENT (OUTPATIENT)
Dept: OTOLARYNGOLOGY | Facility: CLINIC | Age: 55
End: 2020-03-11
Payer: COMMERCIAL

## 2020-03-11 VITALS
BODY MASS INDEX: 25.1 KG/M2 | HEART RATE: 83 BPM | HEIGHT: 64 IN | DIASTOLIC BLOOD PRESSURE: 80 MMHG | WEIGHT: 147 LBS | SYSTOLIC BLOOD PRESSURE: 119 MMHG

## 2020-03-11 PROCEDURE — 99204 OFFICE O/P NEW MOD 45 MIN: CPT | Mod: 25

## 2020-03-11 PROCEDURE — 92504 EAR MICROSCOPY EXAMINATION: CPT

## 2020-03-11 PROCEDURE — 99214 OFFICE O/P EST MOD 30 MIN: CPT | Mod: 25

## 2020-03-11 RX ORDER — AMOXICILLIN AND CLAVULANATE POTASSIUM 875; 125 MG/1; MG/1
875-125 TABLET, COATED ORAL
Qty: 14 | Refills: 0 | Status: DISCONTINUED | COMMUNITY
Start: 2018-01-09 | End: 2020-03-11

## 2020-03-11 RX ORDER — AZITHROMYCIN 250 MG/1
250 TABLET, FILM COATED ORAL
Qty: 6 | Refills: 0 | Status: DISCONTINUED | COMMUNITY
Start: 2017-12-29 | End: 2020-03-11

## 2020-03-11 RX ORDER — NAPROXEN 500 MG/1
500 TABLET, DELAYED RELEASE ORAL
Qty: 30 | Refills: 0 | Status: DISCONTINUED | COMMUNITY
Start: 2017-11-03 | End: 2020-03-11

## 2020-03-11 RX ORDER — PNV NO.95/FERROUS FUM/FOLIC AC 28MG-0.8MG
TABLET ORAL
Refills: 0 | Status: ACTIVE | COMMUNITY

## 2020-03-11 RX ORDER — HYDROCODONE BITARTRATE AND ACETAMINOPHEN 5; 325 MG/1; MG/1
5-325 TABLET ORAL
Qty: 30 | Refills: 0 | Status: DISCONTINUED | COMMUNITY
Start: 2018-06-27 | End: 2020-03-11

## 2020-03-11 RX ORDER — GUAIFENESIN AND CODEINE PHOSPHATE 10; 100 MG/5ML; MG/5ML
100-10 SOLUTION ORAL
Qty: 120 | Refills: 0 | Status: DISCONTINUED | COMMUNITY
Start: 2017-12-30 | End: 2020-03-11

## 2020-03-11 RX ORDER — ALBUTEROL 90 MCG
AEROSOL (GRAM) INHALATION
Refills: 0 | Status: ACTIVE | COMMUNITY

## 2020-03-11 RX ORDER — LEVOTHYROXINE SODIUM 100 UG/1
100 TABLET ORAL
Refills: 0 | Status: ACTIVE | COMMUNITY

## 2020-03-11 RX ORDER — MECLIZINE HYDROCHLORIDE 25 MG/1
25 TABLET ORAL 4 TIMES DAILY
Qty: 60 | Refills: 0 | Status: DISCONTINUED | COMMUNITY
Start: 2018-06-27 | End: 2020-03-11

## 2020-03-11 NOTE — PHYSICAL EXAM
[FreeTextEntry1] : Procedure: Microscopic Ear Exam\par \par Left ear:  Ear canal intact without inflammation or lesion.  \par Tympanic membrane intact without inflammation.  Area of atrophy posteriorly consistent with prior surgery.\par \par Right ear:  Ear canal intact without inflammation or lesion.  \par Tympanic membrane intact without inflammation.\par  Area of atrophy posteriorly consistent with prior surgery.\par \par \par [place] Hearing Assessment\par 512 Hz:\par Feldman test: referred to the left ear\par Rinne test:\par 	Right Ear: Air Conduction > Bone Conduction\par 	Left Ear:   Air Conduction < Bone conduction [Midline] : trachea located in midline position [Normal] : no rashes

## 2020-03-11 NOTE — DATA REVIEWED
[de-identified] : Audiometry from February 2020 provided and reviewed: Mixture and loss in the left ear with air-bone gaps. Carhart notch noted. [de-identified] : CT scan results and images reviewed with the patient in detail.

## 2020-03-11 NOTE — HISTORY OF PRESENT ILLNESS
[de-identified] : ANOOP SOUZA has a history of hearing loss. Patient is being referred by Dr. Tariq to talk about revision stapedectomy.  Right ear surgery in 1990's with good benefit.  Later had surgery for otosclerosis in July 2018.   Significant vertigo, nausea and vomiting was experienced after the surgery.  Hearing improved after surgery.\par \par Reports a slow progressive hearing loss in the left ear.  Buzzing tinnitus reported. \par No noise induced dizziness reported. \par

## 2020-03-11 NOTE — CONSULT LETTER
[Please see my note below.] : Please see my note below. [FreeTextEntry2] : Deaheath LAWSON  [FreeTextEntry1] : Thank you for allowing me to participate in the care of ANOOP SOUZA .\par Please see the attached visit note.\par \par \par \par Balaji Hatch\par Otology\par Department of Otolaryngology\par [facility]

## 2020-03-11 NOTE — ASSESSMENT
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 The etiology of this hearing losses uncertain but is likely to be due to prosthesis displacement. 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 She has no other sequela of a dehiscent syndrome. We discussed this at length.\par \par I have recommended a Vemp test to assess to the semicircular canal dehiscent. I have discussed management options including realistic risk and benefit from revision stapes surgery in the left ear.

## 2020-03-18 ENCOUNTER — APPOINTMENT (OUTPATIENT)
Dept: OTOLARYNGOLOGY | Facility: CLINIC | Age: 55
End: 2020-03-18

## 2020-03-20 ENCOUNTER — TRANSCRIPTION ENCOUNTER (OUTPATIENT)
Age: 55
End: 2020-03-20

## 2020-04-09 ENCOUNTER — TRANSCRIPTION ENCOUNTER (OUTPATIENT)
Age: 55
End: 2020-04-09

## 2020-04-14 ENCOUNTER — APPOINTMENT (OUTPATIENT)
Dept: RHEUMATOLOGY | Facility: CLINIC | Age: 55
End: 2020-04-14
Payer: COMMERCIAL

## 2020-04-14 PROCEDURE — 99214 OFFICE O/P EST MOD 30 MIN: CPT | Mod: 95

## 2020-04-15 ENCOUNTER — TRANSCRIPTION ENCOUNTER (OUTPATIENT)
Age: 55
End: 2020-04-15

## 2020-04-15 DIAGNOSIS — E03.9 HYPOTHYROIDISM, UNSPECIFIED: ICD-10-CM

## 2020-04-15 NOTE — HISTORY OF PRESENT ILLNESS
[Home] : at home, [unfilled] , at the time of the visit. [Other Location: e.g. Home (Enter Location, City,State)___] : at [unfilled] [Patient] : the patient [FreeTextEntry1] : [place]\par - Joint pains continue.  Most affected include the knuckles. \par - notes that has tingling in the fingers now which is relatively new\par - rash on the face continues - is intermittently more severe.  Went to dermatologist who thought perhaps rosacea.  Was treated with abx without improvement\par \par Associated symptoms include:\par -sores in the mouth and nose \par - RP no ulcers\par - recent febrile illness.  unclear etiology. hasn’t been tested for COVID.  March 3-5.   She experienced feeling sick and vomiting but fevers to 101.    increased myalgias.  now improved but it was bad with both hands feet and hips involved- face inflamed - felt knocked down - spent 2 ays in bed - no respiratory -no cough no sob - \par - went gluten free and it helped - had egd - negative for celiac - \par - takes vit d and b12 and thyroid meds\par - desires a more holistic approach to her car\par

## 2020-04-15 NOTE — ASSESSMENT
[FreeTextEntry1] : 55 yo woman PMHX Hashimoto's and Otosclerosis related hearing loss known to our office for polyarthralgia and positive SHANNA\par \par \par #polyarthralgias.  chronic small joint bilateral arthropathy with history of swelling and stiffness suggestive of inflammatory process.  Although the SHANNA is positive, this is in the setting of autoimmune thyroid disease and can be explained by that, though polyautoimmunity may exist.  Other associated symptoms included oral and nasal ulcers, rash, raynauds.\par - Resume Naprosyn for this flare\par - patient desires a holistic approach to care r/b/a of curcumin discussed\par -labs with serologies\par - given the MCP swelling, U/S or MRI of the hands may be helpful diagnostically to assess for inflammatory changes\par \par #Viral gastroenteritis. unclear nature of recent febrile illness. no respiratory symptom or contacts c/w covid\par - observe for now - improved\par

## 2020-04-15 NOTE — PHYSICAL EXAM
[General Appearance - Alert] : alert [General Appearance - In No Acute Distress] : in no acute distress [General Appearance - Well Nourished] : well nourished [General Appearance - Well Developed] : well developed [General Appearance - Well-Appearing] : healthy appearing [] : no respiratory distress [FreeTextEntry1] : positive phalens sign [Oriented To Time, Place, And Person] : oriented to person, place, and time [Impaired Insight] : insight and judgment were intact [Affect] : the affect was normal

## 2020-04-16 LAB
25(OH)D3 SERPL-MCNC: 43.9 NG/ML
ALBUMIN SERPL ELPH-MCNC: 4.5 G/DL
ALP BLD-CCNC: 77 U/L
ALT SERPL-CCNC: 24 U/L
ANION GAP SERPL CALC-SCNC: 13 MMOL/L
AST SERPL-CCNC: 19 U/L
BASOPHILS # BLD AUTO: 0.03 K/UL
BASOPHILS NFR BLD AUTO: 0.9 %
BILIRUB SERPL-MCNC: 0.4 MG/DL
BUN SERPL-MCNC: 14 MG/DL
C3 SERPL-MCNC: 145 MG/DL
C4 SERPL-MCNC: 26 MG/DL
CALCIUM SERPL-MCNC: 9.8 MG/DL
CHLORIDE SERPL-SCNC: 103 MMOL/L
CO2 SERPL-SCNC: 28 MMOL/L
CREAT SERPL-MCNC: 0.75 MG/DL
CRP SERPL-MCNC: 0.5 MG/DL
EOSINOPHIL # BLD AUTO: 0.07 K/UL
EOSINOPHIL NFR BLD AUTO: 2 %
ERYTHROCYTE [SEDIMENTATION RATE] IN BLOOD BY WESTERGREN METHOD: 26 MM/HR
FOLATE SERPL-MCNC: 10.6 NG/ML
HCT VFR BLD CALC: 46 %
HGB BLD-MCNC: 15.1 G/DL
IMM GRANULOCYTES NFR BLD AUTO: 0.3 %
LYMPHOCYTES # BLD AUTO: 1.57 K/UL
LYMPHOCYTES NFR BLD AUTO: 45.2 %
MAN DIFF?: NORMAL
MCHC RBC-ENTMCNC: 32.8 GM/DL
MCHC RBC-ENTMCNC: 33.6 PG
MCV RBC AUTO: 102.2 FL
MONOCYTES # BLD AUTO: 0.31 K/UL
MONOCYTES NFR BLD AUTO: 8.9 %
NEUTROPHILS # BLD AUTO: 1.48 K/UL
NEUTROPHILS NFR BLD AUTO: 42.7 %
PLATELET # BLD AUTO: 186 K/UL
POTASSIUM SERPL-SCNC: 4.9 MMOL/L
PROT SERPL-MCNC: 7.7 G/DL
RBC # BLD: 4.5 M/UL
RBC # FLD: 12.7 %
RHEUMATOID FACT SER QL: <10 IU/ML
SODIUM SERPL-SCNC: 145 MMOL/L
TSH SERPL-ACNC: 3.22 UIU/ML
VIT B12 SERPL-MCNC: 821 PG/ML
WBC # FLD AUTO: 3.47 K/UL

## 2020-04-17 LAB
CCP AB SER IA-ACNC: <8 UNITS
DSDNA AB SER-ACNC: 24 IU/ML
ENA RNP AB SER IA-ACNC: 0.7 AL
ENA SM AB SER IA-ACNC: <0.2 AL
ENA SS-A AB SER IA-ACNC: <0.2 AL
ENA SS-B AB SER IA-ACNC: <0.2 AL
RF+CCP IGG SER-IMP: NEGATIVE

## 2020-04-20 LAB
ANA PAT FLD IF-IMP: ABNORMAL
ANA SER IF-ACNC: ABNORMAL
RNA POLYMERASE III IGG: 8 UNITS

## 2020-04-27 ENCOUNTER — APPOINTMENT (OUTPATIENT)
Dept: RHEUMATOLOGY | Facility: CLINIC | Age: 55
End: 2020-04-27
Payer: COMMERCIAL

## 2020-04-27 PROCEDURE — 99214 OFFICE O/P EST MOD 30 MIN: CPT | Mod: 95

## 2020-04-28 NOTE — ASSESSMENT
[FreeTextEntry1] : 53 yo woman PMHX Hashimoto's and Otosclerosis related hearing loss known to our office for polyarthralgia and positive SHANNA\par \par \par #polyarthralgias.  chronic small joint bilateral arthropathy with history of swelling and stiffness suggestive of inflammatory process.  Although the SHANNA is positive, this is in the setting of autoimmune thyroid disease and can be explained by that, though poly autoimmunity may exist.  Other associated symptoms included oral and nasal ulcers, rash, raynauds.\par - Resume Naprosyn for this flare\par - patient desires a holistic approach to care r/b/a of curcumin discussed - hasn’t started it yet\par - reviewed subserologies - doesn't meet criteria for lupus at this time\par - labs with serologies\par - MRI right hand > left\par - given the MCP swelling, U/S or MRI of the hands may be helpful diagnostically to assess for inflammatory changes\par  - will start curcumin\par - derm - was very inflamed - will followup with the after covid\par - continue vits\par \par #Viral gastroenteritis. unclear nature of recent febrile illness. no respiratory symptom or contacts c/w covid\par - observe for now - improved\par

## 2020-04-28 NOTE — PHYSICAL EXAM
[General Appearance - Alert] : alert [General Appearance - In No Acute Distress] : in no acute distress [General Appearance - Well Nourished] : well nourished [General Appearance - Well Developed] : well developed [General Appearance - Well-Appearing] : healthy appearing [] : no respiratory distress [Oriented To Time, Place, And Person] : oriented to person, place, and time [Impaired Insight] : insight and judgment were intact [Affect] : the affect was normal [FreeTextEntry1] : erythema over cheekks, nose and foreahead

## 2020-04-28 NOTE — REVIEW OF SYSTEMS
[Loss Of Hearing] : hearing loss [Negative] : Heme/Lymph [FreeTextEntry4] : hx of otosclerosis - continued hearing loss. stapedectomy March 93 - CT scan prstethic moved and semicircular distension.  no disequilibrium

## 2020-04-28 NOTE — HISTORY OF PRESENT ILLNESS
[Home] : at home, [unfilled] , at the time of the visit. [Patient] : the patient [FreeTextEntry1] : [place]\par -  Joint pains continue.  Most affected include the knuckles. \par - notes that has tingling in the fingers now which is relatively new\par - rash on the face continues - is intermittently more severe.  Went to dermatologist who thought perhaps rosacea.  Was treated with abx without improvement\par \par Associated symptoms include:\par -sores in the mouth and nose \par - RP no ulcers\par - recent febrile illness. \par - went gluten free and it helped - had egd - negative for celiac - \par - takes vit d and b12 and thyroid meds\par - desires a more holistic approach to her car\par  [Other Location: e.g. Home (Enter Location, City,State)___] : at [unfilled]

## 2020-04-30 LAB
CA VI IGA AB: 9.7 EU/ML
CA VI IGG AB: 7.6 EU/ML
CA VI IGM AB: 3.4 EU/ML
PSP IGA AB: 12.5 EU/ML
PSP IGG AB: 1.8 EU/ML
PSP IGM AB: NORMAL
SEROLOGY COMMENTS: NORMAL
SP-1 IGA AB: 12.4 EU/ML
SP-1 IGG AB: 5.9 EU/ML
SP-1 IGM AB: 7.9 EU/ML

## 2020-05-05 ENCOUNTER — APPOINTMENT (OUTPATIENT)
Dept: MRI IMAGING | Facility: CLINIC | Age: 55
End: 2020-05-05
Payer: COMMERCIAL

## 2020-05-05 ENCOUNTER — RESULT REVIEW (OUTPATIENT)
Age: 55
End: 2020-05-05

## 2020-05-05 ENCOUNTER — OUTPATIENT (OUTPATIENT)
Dept: OUTPATIENT SERVICES | Facility: HOSPITAL | Age: 55
LOS: 1 days | End: 2020-05-05
Payer: COMMERCIAL

## 2020-05-05 DIAGNOSIS — Z98.890 OTHER SPECIFIED POSTPROCEDURAL STATES: Chronic | ICD-10-CM

## 2020-05-05 DIAGNOSIS — Z86.69 PERSONAL HISTORY OF OTHER DISEASES OF THE NERVOUS SYSTEM AND SENSE ORGANS: Chronic | ICD-10-CM

## 2020-05-05 DIAGNOSIS — M25.50 PAIN IN UNSPECIFIED JOINT: ICD-10-CM

## 2020-05-05 PROCEDURE — 73218 MRI UPPER EXTREMITY W/O DYE: CPT | Mod: 26,RT

## 2020-05-05 PROCEDURE — 73218 MRI UPPER EXTREMITY W/O DYE: CPT | Mod: 26,LT,76

## 2020-05-05 PROCEDURE — 73218 MRI UPPER EXTREMITY W/O DYE: CPT

## 2020-07-01 ENCOUNTER — RESULT REVIEW (OUTPATIENT)
Age: 55
End: 2020-07-01

## 2020-07-02 ENCOUNTER — APPOINTMENT (OUTPATIENT)
Dept: ULTRASOUND IMAGING | Facility: CLINIC | Age: 55
End: 2020-07-02
Payer: COMMERCIAL

## 2020-07-02 ENCOUNTER — OUTPATIENT (OUTPATIENT)
Dept: OUTPATIENT SERVICES | Facility: HOSPITAL | Age: 55
LOS: 1 days | End: 2020-07-02
Payer: COMMERCIAL

## 2020-07-02 DIAGNOSIS — Z98.890 OTHER SPECIFIED POSTPROCEDURAL STATES: Chronic | ICD-10-CM

## 2020-07-02 DIAGNOSIS — Z86.69 PERSONAL HISTORY OF OTHER DISEASES OF THE NERVOUS SYSTEM AND SENSE ORGANS: Chronic | ICD-10-CM

## 2020-07-02 DIAGNOSIS — N95.0 POSTMENOPAUSAL BLEEDING: ICD-10-CM

## 2020-07-02 PROCEDURE — 76856 US EXAM PELVIC COMPLETE: CPT

## 2020-07-02 PROCEDURE — 76830 TRANSVAGINAL US NON-OB: CPT | Mod: 26

## 2020-07-02 PROCEDURE — 76830 TRANSVAGINAL US NON-OB: CPT

## 2020-07-02 PROCEDURE — 76856 US EXAM PELVIC COMPLETE: CPT | Mod: 26

## 2020-07-27 ENCOUNTER — RESULT REVIEW (OUTPATIENT)
Age: 55
End: 2020-07-27

## 2020-08-28 ENCOUNTER — OUTPATIENT (OUTPATIENT)
Dept: OUTPATIENT SERVICES | Facility: HOSPITAL | Age: 55
LOS: 1 days | End: 2020-08-28
Payer: COMMERCIAL

## 2020-08-28 ENCOUNTER — APPOINTMENT (OUTPATIENT)
Dept: MAMMOGRAPHY | Facility: CLINIC | Age: 55
End: 2020-08-28
Payer: COMMERCIAL

## 2020-08-28 ENCOUNTER — APPOINTMENT (OUTPATIENT)
Dept: ULTRASOUND IMAGING | Facility: CLINIC | Age: 55
End: 2020-08-28
Payer: COMMERCIAL

## 2020-08-28 DIAGNOSIS — Z12.31 ENCOUNTER FOR SCREENING MAMMOGRAM FOR MALIGNANT NEOPLASM OF BREAST: ICD-10-CM

## 2020-08-28 DIAGNOSIS — Z86.69 PERSONAL HISTORY OF OTHER DISEASES OF THE NERVOUS SYSTEM AND SENSE ORGANS: Chronic | ICD-10-CM

## 2020-08-28 DIAGNOSIS — R92.2 INCONCLUSIVE MAMMOGRAM: ICD-10-CM

## 2020-08-28 DIAGNOSIS — Z98.890 OTHER SPECIFIED POSTPROCEDURAL STATES: Chronic | ICD-10-CM

## 2020-08-28 PROCEDURE — 77067 SCR MAMMO BI INCL CAD: CPT

## 2020-08-28 PROCEDURE — 76641 ULTRASOUND BREAST COMPLETE: CPT

## 2020-08-28 PROCEDURE — 77063 BREAST TOMOSYNTHESIS BI: CPT | Mod: 26

## 2020-08-28 PROCEDURE — 76641 ULTRASOUND BREAST COMPLETE: CPT | Mod: 26,50

## 2020-08-28 PROCEDURE — 77063 BREAST TOMOSYNTHESIS BI: CPT

## 2020-08-28 PROCEDURE — 77067 SCR MAMMO BI INCL CAD: CPT | Mod: 26

## 2020-12-09 ENCOUNTER — APPOINTMENT (OUTPATIENT)
Dept: SPEECH THERAPY | Facility: CLINIC | Age: 55
End: 2020-12-09
Payer: COMMERCIAL

## 2020-12-09 PROCEDURE — 92557 COMPREHENSIVE HEARING TEST: CPT

## 2020-12-09 PROCEDURE — 92700A: CUSTOM

## 2020-12-09 PROCEDURE — 92567 TYMPANOMETRY: CPT

## 2020-12-09 PROCEDURE — 99072 ADDL SUPL MATRL&STAF TM PHE: CPT

## 2020-12-28 ENCOUNTER — NON-APPOINTMENT (OUTPATIENT)
Age: 55
End: 2020-12-28

## 2021-03-01 DIAGNOSIS — Z01.812 ENCOUNTER FOR PREPROCEDURAL LABORATORY EXAMINATION: ICD-10-CM

## 2021-03-08 ENCOUNTER — TRANSCRIPTION ENCOUNTER (OUTPATIENT)
Age: 56
End: 2021-03-08

## 2021-03-12 ENCOUNTER — TRANSCRIPTION ENCOUNTER (OUTPATIENT)
Age: 56
End: 2021-03-12

## 2021-03-20 LAB — SARS-COV-2 N GENE NPH QL NAA+PROBE: NOT DETECTED

## 2021-03-24 ENCOUNTER — LABORATORY RESULT (OUTPATIENT)
Age: 56
End: 2021-03-24

## 2021-03-24 ENCOUNTER — APPOINTMENT (OUTPATIENT)
Dept: PULMONOLOGY | Facility: CLINIC | Age: 56
End: 2021-03-24
Payer: COMMERCIAL

## 2021-03-24 VITALS
SYSTOLIC BLOOD PRESSURE: 108 MMHG | HEART RATE: 72 BPM | DIASTOLIC BLOOD PRESSURE: 70 MMHG | TEMPERATURE: 97.2 F | HEIGHT: 64 IN | OXYGEN SATURATION: 98 % | RESPIRATION RATE: 16 BRPM | WEIGHT: 154 LBS | BODY MASS INDEX: 26.29 KG/M2

## 2021-03-24 DIAGNOSIS — Z82.49 FAMILY HISTORY OF ISCHEMIC HEART DISEASE AND OTHER DISEASES OF THE CIRCULATORY SYSTEM: ICD-10-CM

## 2021-03-24 DIAGNOSIS — E66.3 OVERWEIGHT: ICD-10-CM

## 2021-03-24 DIAGNOSIS — Z82.5 FAMILY HISTORY OF ASTHMA AND OTHER CHRONIC LOWER RESPIRATORY DISEASES: ICD-10-CM

## 2021-03-24 DIAGNOSIS — Z80.8 FAMILY HISTORY OF MALIGNANT NEOPLASM OF OTHER ORGANS OR SYSTEMS: ICD-10-CM

## 2021-03-24 DIAGNOSIS — Z80.3 FAMILY HISTORY OF MALIGNANT NEOPLASM OF BREAST: ICD-10-CM

## 2021-03-24 LAB
25(OH)D3 SERPL-MCNC: 48.6 NG/ML
BASOPHILS # BLD AUTO: 0.03 K/UL
BASOPHILS NFR BLD AUTO: 0.9 %
EOSINOPHIL # BLD AUTO: 0.08 K/UL
EOSINOPHIL NFR BLD AUTO: 2.3 %
HCT VFR BLD CALC: 41.8 %
HGB BLD-MCNC: 14.1 G/DL
IMM GRANULOCYTES NFR BLD AUTO: 0.3 %
LYMPHOCYTES # BLD AUTO: 0.97 K/UL
LYMPHOCYTES NFR BLD AUTO: 27.6 %
MAN DIFF?: NORMAL
MCHC RBC-ENTMCNC: 33.7 GM/DL
MCHC RBC-ENTMCNC: 33.9 PG
MCV RBC AUTO: 100.5 FL
MONOCYTES # BLD AUTO: 0.34 K/UL
MONOCYTES NFR BLD AUTO: 9.7 %
NEUTROPHILS # BLD AUTO: 2.09 K/UL
NEUTROPHILS NFR BLD AUTO: 59.2 %
PLATELET # BLD AUTO: 144 K/UL
RBC # BLD: 4.16 M/UL
RBC # FLD: 13.5 %
WBC # FLD AUTO: 3.52 K/UL

## 2021-03-24 PROCEDURE — ZZZZZ: CPT

## 2021-03-24 PROCEDURE — 95012 NITRIC OXIDE EXP GAS DETER: CPT

## 2021-03-24 PROCEDURE — 94727 GAS DIL/WSHOT DETER LNG VOL: CPT

## 2021-03-24 PROCEDURE — 94618 PULMONARY STRESS TESTING: CPT

## 2021-03-24 PROCEDURE — 94010 BREATHING CAPACITY TEST: CPT

## 2021-03-24 PROCEDURE — 99204 OFFICE O/P NEW MOD 45 MIN: CPT | Mod: 25

## 2021-03-24 PROCEDURE — 99072 ADDL SUPL MATRL&STAF TM PHE: CPT

## 2021-03-24 PROCEDURE — 94729 DIFFUSING CAPACITY: CPT

## 2021-03-24 RX ORDER — FAMOTIDINE 40 MG/1
40 TABLET, FILM COATED ORAL
Qty: 90 | Refills: 1 | Status: ACTIVE | COMMUNITY
Start: 2021-03-24 | End: 1900-01-01

## 2021-03-24 NOTE — ADDENDUM
[FreeTextEntry1] : Documented by Robi Aguilar acting as a scribe for Dr. Deven Pantoja on 03/24/2021.\par \par All medical record entries made by the Scribe were at my, Dr. Deven Pantoja's, direction and personally dictated by me on 03/24/2021 . I have reviewed the chart and agree that the record accurately reflects my personal performance of the history, physical exam, assessment and plan. I have also personally directed, reviewed, and agree with the discharge instructions. \par

## 2021-03-24 NOTE — PROCEDURE
[FreeTextEntry1] : CXR reveals a normal sized heart; no evidence of infiltrate or effusion--a normal appearing chest radiograph\par  \par Full PFT revealed normal flows, with a FEV1 of 2.21L, which is 88% of predicted, normal lung volumes, and a diffusion of 16.6, which is 79% of predicted, with a normal flow volume loop.\par \par 6 minute walk test reveals a low saturation of 96% with no evidence of dyspnea or fatigue; walked 782.4  meters\par  \par FENO was 17; a normal value being less than 25\par Fractional exhaled nitric oxide (FENO) is regarded as a simple, noninvasive method for assessing eosinophilic airway inflammation. Produced by a variety of cells within the lung, nitric oxide (NO) concentrations are generally low in healthy individuals. However, high concentrations of NO appear to be involved in nonspecific host defense mechanisms and chronic inflammatory diseases such as asthma. The American Thoracic Society (ATS) therefore has recommended using FENO to aid in the diagnosis and monitoring of eosinophilic airway inflammation and asthma, and for identifying steroid responsive individuals whose chronic respiratory symptoms may be caused by airway inflammation. \par \par -Images and procedures reviewed in detail and discussed with patient.

## 2021-03-24 NOTE — PHYSICAL EXAM
[No Acute Distress] : no acute distress [Normal Oropharynx] : normal oropharynx [Normal Appearance] : normal appearance [No Neck Mass] : no neck mass [Normal Rate/Rhythm] : normal rate/rhythm [Normal S1, S2] : normal s1, s2 [No Murmurs] : no murmurs [No Resp Distress] : no resp distress [No Abnormalities] : no abnormalities [Benign] : benign [Normal Gait] : normal gait [No Clubbing] : no clubbing [No Cyanosis] : no cyanosis [No Edema] : no edema [FROM] : FROM [Normal Color/ Pigmentation] : normal color/ pigmentation [No Focal Deficits] : no focal deficits [Oriented x3] : oriented x3 [Normal Affect] : normal affect [II] : Mallampati Class: II [Wheeze] : wheeze [TextBox_11] : right ear inflamed [TextBox_68] : I:E ratio 1:3; mild expiratory wheeze
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
## 2021-03-24 NOTE — ASSESSMENT
[FreeTextEntry1] : Ms. SOUZA is a 55 year old female with a history of  OW, nonsmoker, Hashimoto's thyroiditis, hypothyroid, SHANNA positive, carple tunnel, migraines, otosclerosis, allergies, tinnitus, s/p traumatic intubation and extubation following stapedectomy who now comes to the office for an initial pulmonary evaluation. SOB, Chronic Cough (likely Asthma, allergies, GERD, ?BRISSA)\par \par \par Her shortness of breath is multifactorial due to:\par -poor mechanics of breathing \par -out of shape / overweight\par -pulmonary disease\par - asthma, allergies, GERD, ?BRISSA\par -?cardiac disease \par \par Her chronic cough is felt to be multifactorial due to:\par -Asthma \par -Post nasal drip syndrome/ Allergies\par -GERD \par -?Tracheomalacia/ Laryngomalacia \par \par problem 1: mild asthma\par -complete full PFTs\par -add Breztri 2 inhalations BID \par -add Ventolin 2 puffs Q6H, pre-exercise\par Asthma is believed to be caused by inherited (genetic) and environmental factor, but its exact cause is unknown. Asthma may be triggered by allergens, lung infections, or irritants in the air. Asthma triggers are different for each person \par Environmental measures for allergies were encouraged including mattress and pillow covers, air purifier, and environmental controls. \par \par Problem 2: allergies \par -get Blood work to include: asthma panel, food IgE panel, IgE level, eosinophil level, vitamin D level \par -Add Olopatadine 0.6% at 1 sniff/nostril BID \par Environmental measures for allergies were encouraged including mattress and pillow covers, air purifier, and environmental controls. \par \par Problem 3: GERD\par -Add Pepcid 40 mg QHS \par -Rule of 2s: avoid eating too much, eating too late, eating too spicy, eating two hours before bed.\par -Things to avoid including overeating, spicy foods, tight clothing, eating within three hours of bed, this list is not all inclusive. \par -For treatment of reflux, possible options discussed including diet control, H2 blockers, PPIs, as well as coating motility agents discussed as treatment options. Timing of meals and proximity of last meal to sleep were discussed. If symptoms persist, a formal gastrointestinal evaluation is needed.\par \par Problem 4: ?Tracheomalacia/ Laryngomalacia \par -hold on dynamic CT Chest\par Tracheomalacia is usually acquired in adults and common causes include damage by tracheostomy or endotracheal intubation damaging the tracheal cartilage with increase risk with multiple intubations, prolonged intubation, and concurrent high dose steroid therapy; external chest wall trauma and surgery; chronic compression of the trachea by benign etiologies (eg, benign mediastinal goiter) or malignancy; relapsing polychondritis; or recurrent infection. Tracheomalacia can be asymptomatic, however signs or symptoms can develop as the severity of the airway narrowing progresses with major symptoms include dyspnea, cough, and sputum retention. Other symptoms include severe paroxysms of coughing, wheezing or stridor, barking cough and may be exacerbated by forced expiration, cough, and valsalva maneuver. Tracheomalacia is diagnosed by a bronchoscopic visualization of dynamic airway collapse on dynamic chest CT. Therapy is warranted in symptomatic patients with severe tracheomalacia and includes surgical repair as tracheobronchoplasty. The patient was referred to Dr. Yo Prieto or Dr. Ryan Azevedo, at [facility] for a surgical consult. \par \par Problem 5: ?BRISSA (poor memory, fatigue, sleep quality poor)\par -complete home sleep study\par Sleep apnea is associated with adverse clinical consequences which can affect most organ systems. Cardiovascular disease risk includes arrhythmias, atrial fibrillation, hypertension, coronary artery disease, and stroke. Metabolic disorders include diabetes type 2, non-alcoholic fatty liver disease. Mood disorder especially depression; and cognitive decline especially in the elderly. Associations with chronic reflux/Coats’s esophagus some but not all inclusive. \par -Reasons include arousal consistent with hypopnea; respiratory events most prominent in REM sleep or supine position; therefore sleep staging and body position are important for accurate diagnosis and estimation of AHI. \par \par Treatment options discussed including CPAP/BiPAP machine, oral appliance, ProVent therapy, Oxy-Aid by Respitec, new technologies, or positional sleep.Recommended use of the CPAP machine for moderate (AHI >15), moderate to severe (AHI 15-30) and severe patients (AHI > 30). Recommended weight loss which can reduce AHI especially in weight loss of greater than 5% of BMI. Positional sleep is recommended in those with low AHI, low-moderate BMI, and younger age. For severe sleep apnea, the hypoglossal nerve stimulator was recommended as well. \par \par Problem 6: Anxiety\par -recommended to read "The Gift of Maybe" by Kaitlynn Arcos \par \par Problem 7: Rheumatology Sx/ ?Fibromyalgia\par -continue to wear wrist splits during sleep\par -complete blood work for Celiac Comprehensive [place]\par -recommended NeuroRenew\par -Recommended to use Topricin Cream or Australian Dream \par \par problem 8: cardiac disease\par -recommended to continue to follow up with Cardiologist \par \par problem 9: poor breathing mechanics\par -Proper breathing techniques were reviewed with an emphasis of exhalation. Patient instructed to breath in for 1 second and out for four seconds. Patient was encouraged to not talk while walking. \par \par problem 10: out of shape / overweight\par -Weight loss, exercise, and diet control were discussed and are highly encouraged. Treatment options were given such as, aqua therapy, and contacting a nutritionist. Recommended to use the elliptical, stationary bike, less use of treadmill. Mindful eating was explained to the patient Obesity is associated with worsening asthma, shortness of breath, and potential for cardiac disease, diabetes, and other underlying medical conditions\par \par problem 11: health maintenance \par -educated patient on COVID 19 vaccine and appropriate recommendations (J&J recommended) \par -recommended yearly flu shot after October 15\par -recommended strep pneumonia vaccines: Prevnar-13 vaccine, followed by Pneumo vaccine 23 one year following after 65\par -recommended early intervention for Upper Respiratory Infections (URIs)\par -recommended regular osteoporosis evaluations\par -recommended early dermatological evaluations\par -recommended after the age of 50 to the age of 70, colonoscopy every 5 years\par \par F/U in 6-8 weeks.\par She is encouraged to call with any changes, concerns, or questions\par

## 2021-03-24 NOTE — REASON FOR VISIT
[Initial] : an initial visit [TextBox_44] : asthma, allergies, GERD, ?BRISSA, ?Tracheomalacia/ Laryngomalacia

## 2021-03-25 LAB — 24R-OH-CALCIDIOL SERPL-MCNC: 68.5 PG/ML

## 2021-03-26 LAB — CELIACPAN: NORMAL

## 2021-03-27 LAB — TOTAL IGE SMQN RAST: 150 KU/L

## 2021-03-30 LAB
A ALTERNATA IGE QN: <0.1 KUA/L
A FUMIGATUS IGE QN: <0.1 KUA/L
C ALBICANS IGE QN: 0.39 KUA/L
C HERBARUM IGE QN: <0.1 KUA/L
CAT DANDER IGE QN: <0.1 KUA/L
CLAM IGE QN: <0.1 KUA/L
CODFISH IGE QN: <0.1 KUA/L
COMMON RAGWEED IGE QN: <0.1 KUA/L
CORN IGE QN: <0.1 KUA/L
COW MILK IGE QN: 0.87 KUA/L
D FARINAE IGE QN: <0.1 KUA/L
D PTERONYSS IGE QN: <0.1 KUA/L
DEPRECATED A ALTERNATA IGE RAST QL: 0
DEPRECATED A FUMIGATUS IGE RAST QL: 0
DEPRECATED C ALBICANS IGE RAST QL: 1
DEPRECATED C HERBARUM IGE RAST QL: 0
DEPRECATED CAT DANDER IGE RAST QL: 0
DEPRECATED CLAM IGE RAST QL: 0
DEPRECATED CODFISH IGE RAST QL: 0
DEPRECATED COMMON RAGWEED IGE RAST QL: 0
DEPRECATED CORN IGE RAST QL: 0
DEPRECATED COW MILK IGE RAST QL: 2
DEPRECATED D FARINAE IGE RAST QL: 0
DEPRECATED D PTERONYSS IGE RAST QL: 0
DEPRECATED DOG DANDER IGE RAST QL: 0
DEPRECATED EGG WHITE IGE RAST QL: 1
DEPRECATED M RACEMOSUS IGE RAST QL: 0
DEPRECATED PEANUT IGE RAST QL: 0
DEPRECATED ROACH IGE RAST QL: 0
DEPRECATED SCALLOP IGE RAST QL: <0.1 KUA/L
DEPRECATED SESAME SEED IGE RAST QL: 0
DEPRECATED SHRIMP IGE RAST QL: 0
DEPRECATED SOYBEAN IGE RAST QL: 0
DEPRECATED TIMOTHY IGE RAST QL: 0
DEPRECATED WALNUT IGE RAST QL: 0
DEPRECATED WHEAT IGE RAST QL: 0
DEPRECATED WHITE OAK IGE RAST QL: 0
DOG DANDER IGE QN: <0.1 KUA/L
EGG WHITE IGE QN: 0.5 KUA/L
M RACEMOSUS IGE QN: <0.1 KUA/L
PEANUT IGE QN: <0.1 KUA/L
ROACH IGE QN: <0.1 KUA/L
SCALLOP IGE QN: 0
SCALLOP IGE QN: <0.1 KUA/L
SESAME SEED IGE QN: <0.1 KUA/L
SOYBEAN IGE QN: <0.1 KUA/L
TIMOTHY IGE QN: <0.1 KUA/L
WALNUT IGE QN: <0.1 KUA/L
WHEAT IGE QN: <0.1 KUA/L
WHITE OAK IGE QN: <0.1 KUA/L

## 2021-03-31 LAB
CELIAC DISEASE INTERPRETATION: NORMAL
CELIAC GENE PAIRS PRESENT: NORMAL
DQ ALPHA 1: NORMAL
DQ BETA 1: NORMAL
IMMUNOGLOBULIN A (IGA): 408 MG/DL

## 2021-04-01 LAB
ANNOTATION COMMENT IMP: NORMAL
HLA-DQ2: POSITIVE
HLA-DQ8 QL: NEGATIVE
REF LAB TEST METHOD: NORMAL

## 2021-04-09 ENCOUNTER — NON-APPOINTMENT (OUTPATIENT)
Age: 56
End: 2021-04-09

## 2021-04-09 ENCOUNTER — APPOINTMENT (OUTPATIENT)
Dept: RHEUMATOLOGY | Facility: CLINIC | Age: 56
End: 2021-04-09
Payer: COMMERCIAL

## 2021-04-09 VITALS
WEIGHT: 154 LBS | SYSTOLIC BLOOD PRESSURE: 119 MMHG | HEIGHT: 60 IN | BODY MASS INDEX: 30.23 KG/M2 | DIASTOLIC BLOOD PRESSURE: 76 MMHG | OXYGEN SATURATION: 97 % | HEART RATE: 73 BPM

## 2021-04-09 DIAGNOSIS — R76.8 OTHER SPECIFIED ABNORMAL IMMUNOLOGICAL FINDINGS IN SERUM: ICD-10-CM

## 2021-04-09 DIAGNOSIS — R05 COUGH: ICD-10-CM

## 2021-04-09 PROCEDURE — 36415 COLL VENOUS BLD VENIPUNCTURE: CPT

## 2021-04-09 PROCEDURE — 99214 OFFICE O/P EST MOD 30 MIN: CPT | Mod: 25

## 2021-04-09 PROCEDURE — 99072 ADDL SUPL MATRL&STAF TM PHE: CPT

## 2021-04-09 NOTE — PHYSICAL EXAM
[General Appearance - Alert] : alert [General Appearance - In No Acute Distress] : in no acute distress [General Appearance - Well Nourished] : well nourished [General Appearance - Well Developed] : well developed [General Appearance - Well-Appearing] : healthy appearing [] : no respiratory distress [Oriented To Time, Place, And Person] : oriented to person, place, and time [Impaired Insight] : insight and judgment were intact [Affect] : the affect was normal [FreeTextEntry1] : positive phalens sign

## 2021-04-09 NOTE — ASSESSMENT
[FreeTextEntry1] : 55 yo woman PMHX Hashimoto's and Otosclerosis related hearing loss known to our office for polyarthralgia and positive MILADY\par \par #polyarthralgias.  chronic small joint bilateral arthropathy with history of swelling and stiffness suggestive of inflammatory process.  Although the MLIADY is positive, this is in the setting of autoimmune thyroid disease and can be explained by that, though polyautoimmunity may exist.  Other associated symptoms included oral and nasal ulcers, rash, raynauds.\par - patient desires a holistic approach to care r/b/a of curcumin discussed\par - labs with serologies\par - MRI hand (may 2020) - bilaterall - normal without erosions or synvoitis\par - Resume Naprosyn for this flare\par \par #milady positive.  no other s/s of autoimmune ctd \par - check subserologies and inflammatory markers \par

## 2021-04-14 LAB
ANA PAT FLD IF-IMP: ABNORMAL
ANA SER IF-ACNC: ABNORMAL
APPEARANCE: CLEAR
BILIRUBIN URINE: NEGATIVE
BLOOD URINE: NEGATIVE
C3 SERPL-MCNC: 141 MG/DL
C4 SERPL-MCNC: 23 MG/DL
CK SERPL-CCNC: 102 U/L
COLOR: NORMAL
CREAT SPEC-SCNC: 37 MG/DL
CREAT/PROT UR: 0.1 RATIO
CRP SERPL-MCNC: 8 MG/L
DSDNA AB SER-ACNC: 35 IU/ML
ENA RNP AB SER IA-ACNC: 0.4 AL
ENA SM AB SER IA-ACNC: <0.2 AL
ENA SS-A AB SER IA-ACNC: <0.2 AL
ENA SS-B AB SER IA-ACNC: <0.2 AL
ERYTHROCYTE [SEDIMENTATION RATE] IN BLOOD BY WESTERGREN METHOD: 8 MM/HR
GLUCOSE QUALITATIVE U: NEGATIVE
KETONES URINE: NEGATIVE
LEUKOCYTE ESTERASE URINE: NEGATIVE
NITRITE URINE: NEGATIVE
PH URINE: 6.5
PROT UR-MCNC: 5 MG/DL
PROTEIN URINE: NEGATIVE
SPECIFIC GRAVITY URINE: 1.01
UROBILINOGEN URINE: NORMAL

## 2021-04-22 RX ORDER — NAPROXEN 500 MG/1
500 TABLET ORAL
Qty: 60 | Refills: 3 | Status: ACTIVE | COMMUNITY
Start: 2021-04-09 | End: 1900-01-01

## 2021-04-26 LAB
EJ AB SER QL: NEGATIVE
ENA JO1 AB SER IA-ACNC: <20 UNITS
ENA PM/SCL AB SER-ACNC: 23 UNITS
ENA SM+RNP AB SER IA-ACNC: <20 UNITS
ENA SS-A IGG SER QL: <20 UNITS
FIBRILLARIN AB SER QL: NEGATIVE
KU AB SER QL: NEGATIVE
MDA-5 (P140)(CADM-140): <20 UNITS
MI2 AB SER QL: NEGATIVE
NXP-2 (P140): <20 UNITS
OJ AB SER QL: NEGATIVE
PL12 AB SER QL: NEGATIVE
PL7 AB SER QL: NEGATIVE
SRP AB SERPL QL: NEGATIVE
TIF GAMMA (P155/140): <20 UNITS
U2 SNRNP AB SER QL: NEGATIVE

## 2021-05-12 ENCOUNTER — APPOINTMENT (OUTPATIENT)
Dept: PULMONOLOGY | Facility: CLINIC | Age: 56
End: 2021-05-12
Payer: COMMERCIAL

## 2021-05-12 VITALS
BODY MASS INDEX: 29.84 KG/M2 | HEIGHT: 60 IN | DIASTOLIC BLOOD PRESSURE: 70 MMHG | WEIGHT: 152 LBS | HEART RATE: 94 BPM | SYSTOLIC BLOOD PRESSURE: 110 MMHG | TEMPERATURE: 97.3 F | OXYGEN SATURATION: 98 % | RESPIRATION RATE: 16 BRPM

## 2021-05-12 DIAGNOSIS — R06.02 SHORTNESS OF BREATH: ICD-10-CM

## 2021-05-12 DIAGNOSIS — F41.9 ANXIETY DISORDER, UNSPECIFIED: ICD-10-CM

## 2021-05-12 DIAGNOSIS — J45.909 UNSPECIFIED ASTHMA, UNCOMPLICATED: ICD-10-CM

## 2021-05-12 DIAGNOSIS — K21.9 GASTRO-ESOPHAGEAL REFLUX DISEASE W/OUT ESOPHAGITIS: ICD-10-CM

## 2021-05-12 DIAGNOSIS — R76.8 OTHER SPECIFIED ABNORMAL IMMUNOLOGICAL FINDINGS IN SERUM: ICD-10-CM

## 2021-05-12 DIAGNOSIS — R09.82 POSTNASAL DRIP: ICD-10-CM

## 2021-05-12 DIAGNOSIS — R06.83 SNORING: ICD-10-CM

## 2021-05-12 DIAGNOSIS — J31.0 CHRONIC RHINITIS: ICD-10-CM

## 2021-05-12 PROCEDURE — 99214 OFFICE O/P EST MOD 30 MIN: CPT

## 2021-05-12 PROCEDURE — 99072 ADDL SUPL MATRL&STAF TM PHE: CPT

## 2021-05-12 NOTE — REASON FOR VISIT
[Follow-Up] : a follow-up visit [TextBox_44] : asthma, allergies, GERD, ?BRISSA, ?Tracheomalacia/ Laryngomalacia

## 2021-05-12 NOTE — ASSESSMENT
[FreeTextEntry1] : Ms. SOUZA is a 56 year old female with a history of  OW, nonsmoker, Hashimoto's thyroiditis, hypothyroid, SHANNA positive, carpel tunnel, migraines, otosclerosis, allergies, tinnitus, s/p traumatic intubation and extubation following stapedectomy who now comes to the office for an initial pulmonary evaluation. SOB, Chronic Cough (likely Asthma, allergies, GERD, ?BRISSA)- Allergy Sx\par \par Her shortness of breath is multifactorial due to:\par -poor mechanics of breathing \par -out of shape / overweight\par -pulmonary disease\par - asthma, allergies, GERD, ?BRISSA\par -?cardiac disease \par \par Her chronic cough is felt to be multifactorial due to:\par -Asthma \par -Post nasal drip syndrome/ Allergies\par -GERD \par -?Tracheomalacia/ Laryngomalacia \par \par problem 1: mild asthma\par -off Breztri 2 inhalations BID \par -add trial Alvesco 160 2 puffs BID\par -continue Ventolin 2 puffs Q6H, pre-exercise\par Asthma is believed to be caused by inherited (genetic) and environmental factor, but its exact cause is unknown. Asthma may be triggered by allergens, lung infections, or irritants in the air. Asthma triggers are different for each person \par Environmental measures for allergies were encouraged including mattress and pillow covers, air purifier, and environmental controls. \par \par problem 1B: Elevated IgE (150) \par -candidate for xolair \par \par Problem 2: allergies \par -get Blood work to include: asthma panel, food IgE panel,(+) IgE level, eosinophil level, vitamin D level \par -continue Olopatadine 0.6% at 1 sniff/nostril BID \par Environmental measures for allergies were encouraged including mattress and pillow covers, air purifier, and environmental controls. \par \par Problem 3: GERD (improved) \par -continue Pepcid 40 mg QHS \par -Rule of 2s: avoid eating too much, eating too late, eating too spicy, eating two hours before bed.\par -Things to avoid including overeating, spicy foods, tight clothing, eating within three hours of bed, this list is not all inclusive. \par -For treatment of reflux, possible options discussed including diet control, H2 blockers, PPIs, as well as coating motility agents discussed as treatment options. Timing of meals and proximity of last meal to sleep were discussed. If symptoms persist, a formal gastrointestinal evaluation is needed.\par \par Problem 4: ?Tracheomalacia/ Laryngomalacia \par -hold on dynamic CT Chest\par Tracheomalacia is usually acquired in adults and common causes include damage by tracheostomy or endotracheal intubation damaging the tracheal cartilage with increase risk with multiple intubations, prolonged intubation, and concurrent high dose steroid therapy; external chest wall trauma and surgery; chronic compression of the trachea by benign etiologies (eg, benign mediastinal goiter) or malignancy; relapsing polychondritis; or recurrent infection. Tracheomalacia can be asymptomatic, however signs or symptoms can develop as the severity of the airway narrowing progresses with major symptoms include dyspnea, cough, and sputum retention. Other symptoms include severe paroxysms of coughing, wheezing or stridor, barking cough and may be exacerbated by forced expiration, cough, and valsalva maneuver. Tracheomalacia is diagnosed by a bronchoscopic visualization of dynamic airway collapse on dynamic chest CT. Therapy is warranted in symptomatic patients with severe tracheomalacia and includes surgical repair as tracheobronchoplasty. The patient was referred to Dr. Yo Prieto or Dr. Ryan Azevedo, at [facility] for a surgical consult. \par \par Problem 5: ?BRISSA (poor memory, fatigue, sleep quality poor)\par -complete home sleep study\par Sleep apnea is associated with adverse clinical consequences which can affect most organ systems. Cardiovascular disease risk includes arrhythmias, atrial fibrillation, hypertension, coronary artery disease, and stroke. Metabolic disorders include diabetes type 2, non-alcoholic fatty liver disease. Mood disorder especially depression; and cognitive decline especially in the elderly. Associations with chronic reflux/Coats’s esophagus some but not all inclusive. \par -Reasons include arousal consistent with hypopnea; respiratory events most prominent in REM sleep or supine position; therefore sleep staging and body position are important for accurate diagnosis and estimation of AHI. \par \par Treatment options discussed including CPAP/BiPAP machine, oral appliance, ProVent therapy, Oxy-Aid by Respitec, new technologies, or positional sleep.Recommended use of the CPAP machine for moderate (AHI >15), moderate to severe (AHI 15-30) and severe patients (AHI > 30). Recommended weight loss which can reduce AHI especially in weight loss of greater than 5% of BMI. Positional sleep is recommended in those with low AHI, low-moderate BMI, and younger age. For severe sleep apnea, the hypoglossal nerve stimulator was recommended as well. \par \par Problem 6: Anxiety\par -recommended to read "The Gift of Maybe" by Kaitlynn Arcos \par \par Problem 7: Rheumatology Sx/ ?Fibromyalgia\par -continue to wear wrist splits during sleep\par -s/p blood work for Celiac Comprehensive [place](+HLA Dq) \par -recommended NeuroRenew, ArthroSoothe \par -Recommended to use Topricin Cream or Australian Dream \par \par problem 8: cardiac disease\par -recommended to continue to follow up with Cardiologist \par \par problem 9: poor breathing mechanics\par -Proper breathing techniques were reviewed with an emphasis of exhalation. Patient instructed to breath in for 1 second and out for four seconds. Patient was encouraged to not talk while walking. \par \par problem 10: out of shape / overweight\par -Weight loss, exercise, and diet control were discussed and are highly encouraged. Treatment options were given such as, aqua therapy, and contacting a nutritionist. Recommended to use the elliptical, stationary bike, less use of treadmill. Mindful eating was explained to the patient Obesity is associated with worsening asthma, shortness of breath, and potential for cardiac disease, diabetes, and other underlying medical conditions\par \par problem 11: health maintenance \par -s/p Pfizer COVID 19 vaccine \par -educated patient on COVID 19 vaccine and appropriate recommendations (J&J recommended) \par -recommended yearly flu shot after October 15\par -recommended strep pneumonia vaccines: Prevnar-13 vaccine, followed by Pneumo vaccine 23 one year following after 65\par -recommended early intervention for Upper Respiratory Infections (URIs)\par -recommended regular osteoporosis evaluations\par -recommended early dermatological evaluations\par -recommended after the age of 50 to the age of 70, colonoscopy every 5 years\par \par F/U in 6-8 weeks.\par She is encouraged to call with any changes, concerns, or questions\par

## 2021-05-12 NOTE — PHYSICAL EXAM
[No Acute Distress] : no acute distress [Normal Oropharynx] : normal oropharynx [II] : Mallampati Class: II [Normal Appearance] : normal appearance [No Neck Mass] : no neck mass [Normal Rate/Rhythm] : normal rate/rhythm [Normal S1, S2] : normal s1, s2 [No Murmurs] : no murmurs [Wheeze] : wheeze [No Resp Distress] : no resp distress [No Abnormalities] : no abnormalities [Benign] : benign [Normal Gait] : normal gait [No Clubbing] : no clubbing [No Cyanosis] : no cyanosis [No Edema] : no edema [FROM] : FROM [Normal Color/ Pigmentation] : normal color/ pigmentation [No Focal Deficits] : no focal deficits [Oriented x3] : oriented x3 [Normal Affect] : normal affect [Clear to Auscultation Bilaterally] : clear to auscultation bilaterally [TextBox_68] : I:E ratio 1:3; clear

## 2021-05-12 NOTE — HISTORY OF PRESENT ILLNESS
[TextBox_4] : Ms. SOUZA is a 56 year old female presenting to the office today for pulmonary evaluation. Her chief complaint is\par \par -she notes generally feeling well\par -she notes energy levels stable\par -she notes allergies active triggered by recent pollen drop\par -she notes asthma symptoms triggered by hazy, overcast weather\par -she notes not tolerating Breztri inhaler with residual HA and mental fog onset 2 days after initiating\par -she notes heartburn and reflux controlled\par -she notes throat swelling and scar tissue residual to prior intubation with intermittent residual dysphagia\par -she denies palpitations\par -she notes weight stable, aiming to lose weight\par -she notes regular exercise every other day walking\par -she notes cough decreased in severity and frequency\par -she notes intermittent SOB exacerbated by allergies controlled with rescue inhaler\par -she notes moderate hand and feet swelling with residual swelling\par -she notes s/p Pfizer COVID 19 vaccine x1 \par \par \par -denies any nausea, vomiting, fever, chills, sweats, chest pain, chest pressure, diarrhea, constipation, sour taste in the mouth, dizziness, leg swelling, leg pain, myalgias, arthralgias, itchy eyes, itchy ears.\par

## 2021-05-12 NOTE — ADDENDUM
[FreeTextEntry1] : Documented by Robi Aguilar acting as a scribe for Dr. Deven Pantoja on 05/12/2021.\par \par All medical record entries made by the Scribe were at my, Dr. Deven Pantoja's, direction and personally dictated by me on 05/12/2021 . I have reviewed the chart and agree that the record accurately reflects my personal performance of the history, physical exam, assessment and plan. I have also personally directed, reviewed, and agree with the discharge instructions. \par

## 2021-05-13 ENCOUNTER — APPOINTMENT (OUTPATIENT)
Dept: RHEUMATOLOGY | Facility: CLINIC | Age: 56
End: 2021-05-13
Payer: COMMERCIAL

## 2021-05-13 DIAGNOSIS — M25.50 PAIN IN UNSPECIFIED JOINT: ICD-10-CM

## 2021-05-13 DIAGNOSIS — M35.9 SYSTEMIC INVOLVEMENT OF CONNECTIVE TISSUE, UNSPECIFIED: ICD-10-CM

## 2021-05-13 DIAGNOSIS — Z51.81 ENCOUNTER FOR THERAPEUTIC DRUG LVL MONITORING: ICD-10-CM

## 2021-05-13 DIAGNOSIS — Z79.899 OTHER LONG TERM (CURRENT) DRUG THERAPY: ICD-10-CM

## 2021-05-13 PROCEDURE — 99215 OFFICE O/P EST HI 40 MIN: CPT | Mod: 95

## 2021-05-14 RX ORDER — FLUTICASONE PROPIONATE 220 UG/1
220 AEROSOL, METERED RESPIRATORY (INHALATION) TWICE DAILY
Qty: 3 | Refills: 1 | Status: ACTIVE | COMMUNITY
Start: 2021-05-14 | End: 1900-01-01

## 2021-05-14 RX ORDER — CICLESONIDE 160 UG/1
160 AEROSOL, METERED RESPIRATORY (INHALATION) TWICE DAILY
Qty: 3 | Refills: 2 | Status: DISCONTINUED | COMMUNITY
Start: 2021-05-12 | End: 2021-05-14

## 2021-05-14 NOTE — HISTORY OF PRESENT ILLNESS
[Home] : at home, [unfilled] , at the time of the visit. [Other Location: e.g. Home (Enter Location, City,State)___] : at [unfilled] [Verbal consent obtained from patient] : the patient, [unfilled] [FreeTextEntry1] : INTERVAL HX\par - feels the same.   naprosyn okay - helps but not completely\par - going for the 2nd shot of the covid - headache after the first\par - has been trying to keep on the naprosyn - it is hleping \par - saw dr tom yesterday - \par - mild reactions to dairy and egg whites - so gets less asthma and gerd\par - swelling in the am and takes over an hour in the AM\par - intermittently chill

## 2021-05-14 NOTE — ASSESSMENT
[FreeTextEntry1] : 55 yo woman PMHX Hashimoto's and Otosclerosis related hearing loss known to our office for polyarthralgia and positive SHANNA\par \par TEB June 17 TEB 7:30 AM\par patient aware\par \par \par #BMI 29 - \par - discussed diet, exercise and weight loss and impact on CTD\par - walks 3 1/2 miles (under a 15 miute mile)- several days a week\par - will consider weight mgmt center\par \par #UCTD _ SHANNA, borderline dsDNA, borderline PM.  doesn’t make criteria for a specific CTD thought with marked AMS and polyarthritis.  Other associated symptoms included oral and nasal ulcers, rash, raynauds.  this in the context of autoimmune thyroid idsase.\par - NSAID trial ineffective completely\par - d/w patient MDP - decline\par - d/w patient HCQ -vivo on [facility]\par - weight 152 - 70 kg . will start with 40 mg daily loading and then decrease to alternate day for maintenance\par - r/b/a discussed\par \par \par

## 2021-05-14 NOTE — PHYSICAL EXAM
[General Appearance - In No Acute Distress] : in no acute distress [General Appearance - Alert] : alert [General Appearance - Well Nourished] : well nourished [General Appearance - Well Developed] : well developed [General Appearance - Well-Appearing] : healthy appearing [] : no respiratory distress [Oriented To Time, Place, And Person] : oriented to person, place, and time [Impaired Insight] : insight and judgment were intact [Affect] : the affect was normal [FreeTextEntry1] : erythema over cheekks, nose and foreahead - appears to be papular though difficult to tell over video

## 2021-05-26 NOTE — ASU PREOP CHECKLIST - SURGICAL CONSENT
Chief Complaint   Patient presents with    Hypertension    Diabetes     bs 82 after meals     Chronic Kidney Disease    Cholesterol Problem     1. Have you been to the ER, urgent care clinic since your last visit? Hospitalized since your last visit? No    2. Have you seen or consulted any other health care providers outside of the [address] [place] [address] since your last visit? Include any pap smears or colon screening. Yes, nephology last year.     Health Maintenance Due   Topic Date Due    DTaP/Tdap/Td series (1 - Tdap) Never done    Shingrix Vaccine Age 50> (1 of 2) Never done    Pneumococcal 65+ years (2 of 2 - PPSV23) 11/04/2015    Foot Exam Q1  11/19/2019    A1C test (Diabetic or Prediabetic)  08/04/2020    Medicare Yearly Exam  02/04/2021    MICROALBUMIN Q1  02/04/2021    Lipid Screen  02/04/2021 done

## 2021-06-10 ENCOUNTER — NON-APPOINTMENT (OUTPATIENT)
Age: 56
End: 2021-06-10

## 2021-06-17 ENCOUNTER — APPOINTMENT (OUTPATIENT)
Dept: RHEUMATOLOGY | Facility: CLINIC | Age: 56
End: 2021-06-17

## 2021-07-20 NOTE — REASON FOR VISIT
Doing good  Pt reports 100% medication compliance  · Stressed the importance of 100% medication adherence  · No signs of OI's or illness  · Reminder for pt to complete labs on 21  · Educated pt on the importance of  Ensure from HAART by six hours    HIV Counseling:    Viral Load: 508902 copies     CD4 Count: 21    ART: [id number]    Denies side effects  Stressed the importance of adherence  Continue follow up in the ID clinic with Dr Georgia Scott  Reviewed the most recent labs, including the Cd4 and viral load  Discussed the risks and benefits of treatment options, instructions for management, importance of treatment adherence, and reduction of risk factors  Educated on possible medication side effects  Counseled on routes of HIV transmission, including the risk of  infection  Emphasized that viral suppression is the best method to prevent HIV transmission  At this time the pt denies the need for HIV testing of anyone in their life  Total encounter time was greater than 35 minutes  Greater than 20 minutes were spent on counseling and patient education  Pt voices understanding and agreement with treatment plan  [Follow-Up: _____] : a [unfilled] follow-up visit

## 2021-08-18 ENCOUNTER — APPOINTMENT (OUTPATIENT)
Dept: PULMONOLOGY | Facility: CLINIC | Age: 56
End: 2021-08-18

## 2021-09-11 ENCOUNTER — APPOINTMENT (OUTPATIENT)
Dept: MAMMOGRAPHY | Facility: CLINIC | Age: 56
End: 2021-09-11
Payer: COMMERCIAL

## 2021-09-11 ENCOUNTER — OUTPATIENT (OUTPATIENT)
Dept: OUTPATIENT SERVICES | Facility: HOSPITAL | Age: 56
LOS: 1 days | End: 2021-09-11
Payer: COMMERCIAL

## 2021-09-11 ENCOUNTER — APPOINTMENT (OUTPATIENT)
Dept: ULTRASOUND IMAGING | Facility: CLINIC | Age: 56
End: 2021-09-11
Payer: COMMERCIAL

## 2021-09-11 DIAGNOSIS — Z98.890 OTHER SPECIFIED POSTPROCEDURAL STATES: Chronic | ICD-10-CM

## 2021-09-11 DIAGNOSIS — Z00.8 ENCOUNTER FOR OTHER GENERAL EXAMINATION: ICD-10-CM

## 2021-09-11 DIAGNOSIS — Z86.69 PERSONAL HISTORY OF OTHER DISEASES OF THE NERVOUS SYSTEM AND SENSE ORGANS: Chronic | ICD-10-CM

## 2021-09-11 DIAGNOSIS — R92.2 INCONCLUSIVE MAMMOGRAM: ICD-10-CM

## 2021-09-11 DIAGNOSIS — Z12.31 ENCOUNTER FOR SCREENING MAMMOGRAM FOR MALIGNANT NEOPLASM OF BREAST: ICD-10-CM

## 2021-09-11 PROCEDURE — 76641 ULTRASOUND BREAST COMPLETE: CPT | Mod: 26,50

## 2021-09-11 PROCEDURE — 77063 BREAST TOMOSYNTHESIS BI: CPT | Mod: 26

## 2021-09-11 PROCEDURE — 76641 ULTRASOUND BREAST COMPLETE: CPT

## 2021-09-11 PROCEDURE — 77067 SCR MAMMO BI INCL CAD: CPT

## 2021-09-11 PROCEDURE — 77063 BREAST TOMOSYNTHESIS BI: CPT

## 2021-09-11 PROCEDURE — 77067 SCR MAMMO BI INCL CAD: CPT | Mod: 26

## 2021-09-26 NOTE — ASU PATIENT PROFILE, ADULT - FALL HARM RISK CONCLUSION
[Dear  ___] : Dear  [unfilled], [Consult Letter:] : I had the pleasure of evaluating your patient, [unfilled]. [Please see my note below.] : Please see my note below. [Consult Closing:] : Thank you very much for allowing me to participate in the care of this patient.  If you have any questions, please do not hesitate to contact me. [Sincerely,] : Sincerely, [FreeTextEntry3] : Xuan Brumfield M.D.\par Director of Pediatric Gastroenterology and Nutrition\par [facility]\par  Universal Safety Interventions

## 2021-11-27 ENCOUNTER — TRANSCRIPTION ENCOUNTER (OUTPATIENT)
Age: 56
End: 2021-11-27

## 2021-12-08 ENCOUNTER — APPOINTMENT (OUTPATIENT)
Dept: RADIOLOGY | Facility: CLINIC | Age: 56
End: 2021-12-08
Payer: COMMERCIAL

## 2021-12-08 ENCOUNTER — OUTPATIENT (OUTPATIENT)
Dept: OUTPATIENT SERVICES | Facility: HOSPITAL | Age: 56
LOS: 1 days | End: 2021-12-08
Payer: COMMERCIAL

## 2021-12-08 ENCOUNTER — APPOINTMENT (OUTPATIENT)
Dept: ULTRASOUND IMAGING | Facility: CLINIC | Age: 56
End: 2021-12-08
Payer: COMMERCIAL

## 2021-12-08 DIAGNOSIS — Z00.8 ENCOUNTER FOR OTHER GENERAL EXAMINATION: ICD-10-CM

## 2021-12-08 DIAGNOSIS — Z98.890 OTHER SPECIFIED POSTPROCEDURAL STATES: Chronic | ICD-10-CM

## 2021-12-08 DIAGNOSIS — Z86.69 PERSONAL HISTORY OF OTHER DISEASES OF THE NERVOUS SYSTEM AND SENSE ORGANS: Chronic | ICD-10-CM

## 2021-12-08 DIAGNOSIS — E01.0 IODINE-DEFICIENCY RELATED DIFFUSE (ENDEMIC) GOITER: ICD-10-CM

## 2021-12-08 PROCEDURE — 77080 DXA BONE DENSITY AXIAL: CPT

## 2021-12-08 PROCEDURE — 76536 US EXAM OF HEAD AND NECK: CPT | Mod: 26

## 2021-12-08 PROCEDURE — 77085 DXA BONE DENSITY AXL VRT FX: CPT

## 2021-12-08 PROCEDURE — 76536 US EXAM OF HEAD AND NECK: CPT

## 2021-12-08 PROCEDURE — 77085 DXA BONE DENSITY AXL VRT FX: CPT | Mod: 26

## 2022-03-20 ENCOUNTER — NON-APPOINTMENT (OUTPATIENT)
Age: 57
End: 2022-03-20

## 2022-03-21 ENCOUNTER — APPOINTMENT (OUTPATIENT)
Dept: OTOLARYNGOLOGY | Facility: CLINIC | Age: 57
End: 2022-03-21
Payer: COMMERCIAL

## 2022-03-21 VITALS — WEIGHT: 149 LBS | BODY MASS INDEX: 29.25 KG/M2 | TEMPERATURE: 96.7 F | HEIGHT: 60 IN

## 2022-03-21 DIAGNOSIS — H90.8 MIXED CONDUCTIVE AND SENSORINEURAL HEARING LOSS, UNSPECIFIED: ICD-10-CM

## 2022-03-21 DIAGNOSIS — H80.93 UNSPECIFIED OTOSCLEROSIS, BILATERAL: ICD-10-CM

## 2022-03-21 DIAGNOSIS — H93.12 TINNITUS, LEFT EAR: ICD-10-CM

## 2022-03-21 DIAGNOSIS — H83.8X2 OTHER SPECIFIED DISEASES OF LEFT INNER EAR: ICD-10-CM

## 2022-03-21 PROCEDURE — 99214 OFFICE O/P EST MOD 30 MIN: CPT

## 2022-03-21 PROCEDURE — 92557 COMPREHENSIVE HEARING TEST: CPT

## 2022-03-21 PROCEDURE — 92567 TYMPANOMETRY: CPT

## 2022-03-21 PROCEDURE — 92504 EAR MICROSCOPY EXAMINATION: CPT

## 2022-03-21 RX ORDER — OLOPATADINE HYDROCHLORIDE 665 UG/1
0.6 SPRAY, METERED NASAL
Qty: 3 | Refills: 1 | Status: DISCONTINUED | COMMUNITY
Start: 2021-03-24 | End: 2022-03-21

## 2022-03-21 RX ORDER — BUDESONIDE, GLYCOPYRROLATE, AND FORMOTEROL FUMARATE 160; 9; 4.8 UG/1; UG/1; UG/1
160-9-4.8 AEROSOL, METERED RESPIRATORY (INHALATION)
Qty: 3 | Refills: 1 | Status: DISCONTINUED | COMMUNITY
Start: 2021-03-24 | End: 2022-03-21

## 2022-03-21 RX ORDER — HYDROXYCHLOROQUINE SULFATE 200 MG/1
200 TABLET, FILM COATED ORAL
Qty: 120 | Refills: 3 | Status: DISCONTINUED | COMMUNITY
Start: 2021-05-13 | End: 2022-03-21

## 2022-03-21 NOTE — ASSESSMENT
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  CT imaging is consistent with significant otosclerosis.  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  Currently there are no clinical signs of semicircular canal dehiscence syndrome.  I have reviewed these findings in detail with the patient.\par \par We discussed management options in detail including but not limited to surgical intervention.  I have discussed the significant risk associated with this type of surgery.  I have recommended amplification evaluation.  Surgical intervention may be considered further if she is not satisfied with amplification.\par \par We also discussed the potential role for bisphosphonate treatment for the cochlear otosclerosis.  She apparently has a history of osteopenia.  I have suggested that she discuss this with her endocrinologist.\par

## 2022-03-21 NOTE — DATA REVIEWED
[de-identified] : In light of the patients current symptoms, Complete audiometry was ordered and completed today. I have interpreted these results and reviewed them in detail with the patient.\par \par Today's testing is reviewed and compared to prior testing.  A significant mixed hearing loss in the left ear with large air-bone gaps is identified.  Deep tympanometry in the left ear.

## 2022-03-21 NOTE — HISTORY OF PRESENT ILLNESS
[de-identified] : ANOOP SOUZA has a history of hearing loss. Patient is being referred by Dr. Tariq to talk about revision stapedectomy.  Right ear surgery in 1990's with good benefit.  Later had surgery for otosclerosis in July 2018.   Significant vertigo, nausea and vomiting was experienced after the surgery.  Hearing improved after surgery.\par \par Reports a slow progressive hearing loss in the left ear.  Buzzing tinnitus reported. \par No noise induced dizziness reported. \par  [FreeTextEntry1] : 03/21/2022 \par Follow up for hearing loss.  Reports worse hearing in the left ear. pressure in the left ear. No hearing aid use reported. Tinnitus in the left ear.  Occasional imbalance reported.  No vertigo. No autophony reported. No noise induced dizziness reported. \par

## 2022-03-21 NOTE — PHYSICAL EXAM
[Normal] : temporomandibular joint is normal [FreeTextEntry1] : Procedure: Microscopic Ear Exam\par \par Left ear:  Ear canal intact without inflammation or lesion.  \par Tympanic membrane intact without inflammation.  Postsurgical changes noted.\par \par [place] Hearing Assessment\par 512 Hz:\par Feldman test: referred to the left ear\par Rinne test:\par 	Right Ear: Air Conduction > Bone Conduction\par 	Left Ear:   Air Conduction < Bone conduction into\par \par [place] Hearing Assessment\par 1024 hz:\par Feldman test: referred to the midline \par Rinne test:\par 	Right Ear: Air Conduction > Bone Conduction\par 	Left Ear:   Air Conduction equals bone conduction\par \par Right ear:  Ear canal intact without inflammation or lesion.  \par Tympanic membrane intact without inflammation.  Postsurgical changes noted.\par

## 2022-03-21 NOTE — CONSULT LETTER
[Please see my note below.] : Please see my note below. [FreeTextEntry2] : Deaheath LAWSON  [FreeTextEntry1] : Thank you for allowing me to participate in the care of ANOOP SOUZA .\par Please see the attached visit note.\par \par \par \par Balaji Hatch\par Otology\par Medical Director of [facility]\par Department of Otolaryngology\par [facility]  [DrJustyn  ___] : Dr. MONTES

## 2022-03-25 ENCOUNTER — NON-APPOINTMENT (OUTPATIENT)
Age: 57
End: 2022-03-25

## 2022-04-13 ENCOUNTER — APPOINTMENT (OUTPATIENT)
Dept: PHARMACY | Facility: CLINIC | Age: 57
End: 2022-04-13
Payer: SELF-PAY

## 2022-04-13 PROCEDURE — V5264C: CUSTOM

## 2022-04-13 PROCEDURE — V5010 ASSESSMENT FOR HEARING AID: CPT

## 2022-04-13 PROCEDURE — V5264E: CUSTOM

## 2022-04-28 PROBLEM — H93.12 TINNITUS, LEFT EAR: Status: ACTIVE | Noted: 2018-06-01

## 2022-04-28 PROBLEM — H83.8X2 SUPERIOR SEMICIRCULAR CANAL DEHISCENCE OF LEFT EAR: Status: ACTIVE | Noted: 2020-03-11

## 2022-05-09 ENCOUNTER — APPOINTMENT (OUTPATIENT)
Dept: PHARMACY | Facility: CLINIC | Age: 57
End: 2022-05-09
Payer: COMMERCIAL

## 2022-05-09 PROCEDURE — V5261A: CUSTOM

## 2022-06-07 ENCOUNTER — APPOINTMENT (OUTPATIENT)
Dept: PHARMACY | Facility: CLINIC | Age: 57
End: 2022-06-07
Payer: SELF-PAY

## 2022-06-07 PROCEDURE — V5299A: CUSTOM | Mod: NC

## 2022-07-04 ENCOUNTER — NON-APPOINTMENT (OUTPATIENT)
Age: 57
End: 2022-07-04

## 2022-07-19 ENCOUNTER — APPOINTMENT (OUTPATIENT)
Dept: PHARMACY | Facility: CLINIC | Age: 57
End: 2022-07-19

## 2022-07-19 PROCEDURE — V5299A: CUSTOM | Mod: NC

## 2022-09-20 ENCOUNTER — OUTPATIENT (OUTPATIENT)
Dept: OUTPATIENT SERVICES | Facility: HOSPITAL | Age: 57
LOS: 1 days | End: 2022-09-20
Payer: COMMERCIAL

## 2022-09-20 ENCOUNTER — APPOINTMENT (OUTPATIENT)
Dept: ULTRASOUND IMAGING | Facility: CLINIC | Age: 57
End: 2022-09-20

## 2022-09-20 ENCOUNTER — APPOINTMENT (OUTPATIENT)
Dept: MAMMOGRAPHY | Facility: CLINIC | Age: 57
End: 2022-09-20

## 2022-09-20 DIAGNOSIS — Z98.890 OTHER SPECIFIED POSTPROCEDURAL STATES: Chronic | ICD-10-CM

## 2022-09-20 DIAGNOSIS — Z12.31 ENCOUNTER FOR SCREENING MAMMOGRAM FOR MALIGNANT NEOPLASM OF BREAST: ICD-10-CM

## 2022-09-20 DIAGNOSIS — Z86.69 PERSONAL HISTORY OF OTHER DISEASES OF THE NERVOUS SYSTEM AND SENSE ORGANS: Chronic | ICD-10-CM

## 2022-09-20 PROCEDURE — 77063 BREAST TOMOSYNTHESIS BI: CPT

## 2022-09-20 PROCEDURE — 76641 ULTRASOUND BREAST COMPLETE: CPT

## 2022-09-20 PROCEDURE — 77067 SCR MAMMO BI INCL CAD: CPT | Mod: 26

## 2022-09-20 PROCEDURE — 77067 SCR MAMMO BI INCL CAD: CPT

## 2022-09-20 PROCEDURE — 76641 ULTRASOUND BREAST COMPLETE: CPT | Mod: 26,50

## 2022-09-20 PROCEDURE — 77063 BREAST TOMOSYNTHESIS BI: CPT | Mod: 26

## 2022-11-28 ENCOUNTER — APPOINTMENT (OUTPATIENT)
Dept: DERMATOLOGY | Facility: CLINIC | Age: 57
End: 2022-11-28

## 2022-11-28 ENCOUNTER — NON-APPOINTMENT (OUTPATIENT)
Age: 57
End: 2022-11-28

## 2022-11-28 VITALS — BODY MASS INDEX: 25.61 KG/M2 | HEIGHT: 64 IN | WEIGHT: 150 LBS

## 2022-11-28 PROCEDURE — 99203 OFFICE O/P NEW LOW 30 MIN: CPT | Mod: 25

## 2022-11-28 PROCEDURE — 11200 RMVL SKIN TAGS UP TO&INC 15: CPT

## 2023-01-03 ENCOUNTER — OUTPATIENT (OUTPATIENT)
Dept: OUTPATIENT SERVICES | Facility: HOSPITAL | Age: 58
LOS: 1 days | End: 2023-01-03
Payer: COMMERCIAL

## 2023-01-03 VITALS
RESPIRATION RATE: 14 BRPM | SYSTOLIC BLOOD PRESSURE: 123 MMHG | DIASTOLIC BLOOD PRESSURE: 83 MMHG | WEIGHT: 159.17 LBS | HEART RATE: 81 BPM | TEMPERATURE: 98 F | OXYGEN SATURATION: 96 % | HEIGHT: 64.5 IN

## 2023-01-03 DIAGNOSIS — M20.12 HALLUX VALGUS (ACQUIRED), LEFT FOOT: ICD-10-CM

## 2023-01-03 DIAGNOSIS — Z97.4 PRESENCE OF EXTERNAL HEARING-AID: ICD-10-CM

## 2023-01-03 DIAGNOSIS — Z01.818 ENCOUNTER FOR OTHER PREPROCEDURAL EXAMINATION: ICD-10-CM

## 2023-01-03 DIAGNOSIS — E03.9 HYPOTHYROIDISM, UNSPECIFIED: ICD-10-CM

## 2023-01-03 DIAGNOSIS — Z86.69 PERSONAL HISTORY OF OTHER DISEASES OF THE NERVOUS SYSTEM AND SENSE ORGANS: Chronic | ICD-10-CM

## 2023-01-03 DIAGNOSIS — Z90.09 ACQUIRED ABSENCE OF OTHER PART OF HEAD AND NECK: Chronic | ICD-10-CM

## 2023-01-03 DIAGNOSIS — Z98.890 OTHER SPECIFIED POSTPROCEDURAL STATES: Chronic | ICD-10-CM

## 2023-01-03 DIAGNOSIS — M20.42 OTHER HAMMER TOE(S) (ACQUIRED), LEFT FOOT: ICD-10-CM

## 2023-01-03 DIAGNOSIS — M20.5X2 OTHER DEFORMITIES OF TOE(S) (ACQUIRED), LEFT FOOT: ICD-10-CM

## 2023-01-03 DIAGNOSIS — K21.9 GASTRO-ESOPHAGEAL REFLUX DISEASE WITHOUT ESOPHAGITIS: ICD-10-CM

## 2023-01-03 DIAGNOSIS — J45.909 UNSPECIFIED ASTHMA, UNCOMPLICATED: ICD-10-CM

## 2023-01-03 DIAGNOSIS — M24.575 CONTRACTURE, LEFT FOOT: ICD-10-CM

## 2023-01-03 LAB
ANION GAP SERPL CALC-SCNC: 7 MMOL/L — SIGNIFICANT CHANGE UP (ref 5–17)
BUN SERPL-MCNC: 21 MG/DL — SIGNIFICANT CHANGE UP (ref 7–23)
CALCIUM SERPL-MCNC: 9.8 MG/DL — SIGNIFICANT CHANGE UP (ref 8.4–10.5)
CHLORIDE SERPL-SCNC: 103 MMOL/L — SIGNIFICANT CHANGE UP (ref 96–108)
CO2 SERPL-SCNC: 29 MMOL/L — SIGNIFICANT CHANGE UP (ref 22–31)
CREAT SERPL-MCNC: 0.7 MG/DL — SIGNIFICANT CHANGE UP (ref 0.5–1.3)
EGFR: 99 ML/MIN/1.73M2 — SIGNIFICANT CHANGE UP
GLUCOSE SERPL-MCNC: 80 MG/DL — SIGNIFICANT CHANGE UP (ref 70–99)
HCT VFR BLD CALC: 45.7 % — HIGH (ref 34.5–45)
HGB BLD-MCNC: 15.4 G/DL — SIGNIFICANT CHANGE UP (ref 11.5–15.5)
MCHC RBC-ENTMCNC: 33.7 GM/DL — SIGNIFICANT CHANGE UP (ref 32–36)
MCHC RBC-ENTMCNC: 34.9 PG — HIGH (ref 27–34)
MCV RBC AUTO: 103.6 FL — HIGH (ref 80–100)
NRBC # BLD: 0 /100 WBCS — SIGNIFICANT CHANGE UP (ref 0–0)
PLATELET # BLD AUTO: 183 K/UL — SIGNIFICANT CHANGE UP (ref 150–400)
POTASSIUM SERPL-MCNC: 4.4 MMOL/L — SIGNIFICANT CHANGE UP (ref 3.5–5.3)
POTASSIUM SERPL-SCNC: 4.4 MMOL/L — SIGNIFICANT CHANGE UP (ref 3.5–5.3)
RBC # BLD: 4.41 M/UL — SIGNIFICANT CHANGE UP (ref 3.8–5.2)
RBC # FLD: 12.3 % — SIGNIFICANT CHANGE UP (ref 10.3–14.5)
SODIUM SERPL-SCNC: 139 MMOL/L — SIGNIFICANT CHANGE UP (ref 135–145)
WBC # BLD: 3.31 K/UL — LOW (ref 3.8–10.5)
WBC # FLD AUTO: 3.31 K/UL — LOW (ref 3.8–10.5)

## 2023-01-03 PROCEDURE — 93010 ELECTROCARDIOGRAM REPORT: CPT | Mod: NC

## 2023-01-03 RX ORDER — MECLIZINE HCL 12.5 MG
0 TABLET ORAL
Qty: 0 | Refills: 0 | DISCHARGE

## 2023-01-03 RX ORDER — PREGABALIN 225 MG/1
1 CAPSULE ORAL
Qty: 0 | Refills: 0 | DISCHARGE

## 2023-01-03 RX ORDER — CHOLECALCIFEROL (VITAMIN D3) 125 MCG
1 CAPSULE ORAL
Qty: 0 | Refills: 0 | DISCHARGE

## 2023-01-03 RX ORDER — LEVOTHYROXINE SODIUM 125 MCG
1 TABLET ORAL
Qty: 0 | Refills: 0 | DISCHARGE

## 2023-01-03 NOTE — H&P PST ADULT - HISTORY OF PRESENT ILLNESS
56yo female with medical h/o Hypothyroid, Asthma (reports controlled), GERD. reports pshx Stapedectomy w/hearing aids, bilateral, c/o left foot pain and dx Left foot bunion and Left foot hammer toes. Pt presents today for PST for scheduled Correction Hallux Valgus Left Foot and Correction Hammer toes Left Foot on 1/13/2023 56yo female with medical h/o Hypothyroid, Asthma (reports controlled), GERD. reports pshx Stapedectomy for h/o Otosclerosis w/hearing aids, bilateral, c/o left foot pain and dx Left foot bunion and Left foot hammer toes. Pt presents today for PST for scheduled Correction Hallux Valgus Left Foot and Correction Hammer toes Left Foot on 1/13/2023

## 2023-01-03 NOTE — H&P PST ADULT - NSANTHOSAYNRD_GEN_A_CORE
neck 13.5inches/No. BRISSA screening performed.  STOP BANG Legend: 0-2 = LOW Risk; 3-4 = INTERMEDIATE Risk; 5-8 = HIGH Risk

## 2023-01-03 NOTE — H&P PST ADULT - NSICDXPROCEDURE_GEN_ALL_CORE_FT
PROCEDURES:  Colton-Adair bunionectomy of left foot 03-Jan-2023 08:28:31  Genie Be  Correction, hammer toe, 2 toes 03-Jan-2023 08:30:07  Genie Be

## 2023-01-03 NOTE — H&P PST ADULT - PROBLEM SELECTOR PLAN 1
Pre-op instructions given. Pt verbalized understanding  Chlorhexidine wash instructions given  Pt instructed to HOLD all NSAID, Herbal tea/supplements, 7 days before surgery   Pending: M/C  Pending: Covid pcr test/results

## 2023-01-03 NOTE — H&P PST ADULT - NEGATIVE ENMT SYMPTOMS
no hearing difficulty/no ear pain/no tinnitus/no vertigo/no sinus symptoms/no nasal discharge/no nasal obstruction/no throat pain/no dysphagia no ear pain/no tinnitus/no vertigo/no sinus symptoms/no nasal discharge/no nasal obstruction/no throat pain/no dysphagia

## 2023-01-03 NOTE — H&P PST ADULT - NSICDXPASTSURGICALHX_GEN_ALL_CORE_FT
PAST SURGICAL HISTORY:  H/O ear disorder right  stapedectomy - 3/93    H/O stapedectomy     History of bunionectomy of right great toe     S/P ear surgery Stapedectomy 7/3/18    S/P left knee arthroscopy 7/09

## 2023-01-03 NOTE — H&P PST ADULT - NSICDXPASTMEDICALHX_GEN_ALL_CORE_FT
PAST MEDICAL HISTORY:  Asthma     Bunion, left     Deformity of foot, right     Hammer toe of left foot     Hiatal hernia     Hypothyroid last TFT's 6 weeks ago - normal    Otosclerosis of both ears     Vertigo

## 2023-01-03 NOTE — H&P PST ADULT - MOTOR
Message  08/30/2017 @ 339 PM   called, requesting any information in regards to the pre authorization for Brilinloreta...left phone number for us to call [phone]...      @ 4:18 PM Called  back... spoke to him... told him that we are to wait for the time the appeal might take and then we will have an update...  again, asked if he can buy out of pocket medication and start on it or just wait... i did let him know that as per Melinda conversation patient is to wait for insurance appeal response. He understood and i did let him know that once we receive any type of update we will contact him.      Signatures   Electronically signed by : Kriss Atkinson CMA; Aug 30 2017  4:20PM CST    
intact

## 2023-01-03 NOTE — H&P PST ADULT - ENMT COMMENTS
hearing aids worn hearing aids worn s/p stapedectomy h/o otosclerosis hearing aids worn s/p stapedectomy h/o otosclerosis + denies loose teeth

## 2023-01-03 NOTE — H&P PST ADULT - NSICDXFAMILYHX_GEN_ALL_CORE_FT
FAMILY HISTORY:  Father  Still living? No  Family history of cerebrovascular accident (CVA), Age at diagnosis: Age Unknown  Family history of myocardial infarction, Age at diagnosis: Age Unknown  Family history of Parkinson disease, Age at diagnosis: Age Unknown    Mother  Still living? No  Family history of aortic stenosis, Age at diagnosis: Age Unknown  Family history of arrhythmia, Age at diagnosis: Age Unknown  Family history of diabetes mellitus, Age at diagnosis: Age Unknown  Family history of pacemaker, Age at diagnosis: Age Unknown    Sibling  Still living? Yes, Estimated age: 61-70  Family history of breast cancer, Age at diagnosis: Age Unknown  Family history of melanoma, Age at diagnosis: Age Unknown

## 2023-01-03 NOTE — H&P PST ADULT - NSSUBSTANCEUSE_GEN_ALL_CORE_SD
denies illicit drug use/caffeine Tetracycline Pregnancy And Lactation Text: This medication is Pregnancy Category D and not consider safe during pregnancy. It is also excreted in breast milk.

## 2023-01-04 PROCEDURE — 36415 COLL VENOUS BLD VENIPUNCTURE: CPT

## 2023-01-04 PROCEDURE — 80048 BASIC METABOLIC PNL TOTAL CA: CPT

## 2023-01-04 PROCEDURE — G0463: CPT

## 2023-01-04 PROCEDURE — 85027 COMPLETE CBC AUTOMATED: CPT

## 2023-01-04 PROCEDURE — 93005 ELECTROCARDIOGRAM TRACING: CPT

## 2023-01-06 NOTE — H&P PST ADULT - PRO ANTICIPATED DISCH DISP
Pt asked if we'd call in Jan 2023 regarding diabetes referral. Talked with wife who states pt is in remission from cancer and pt's blood sugars have been good for 3 months. Pt lost a lot of time at work and is traveling a lot for the next 3 months. Wife wants referral active stating they will call in late Spring. We will attempt a follow up call in May if we have not heard from pt by then.
home

## 2023-01-11 LAB — SARS-COV-2 N GENE NPH QL NAA+PROBE: NOT DETECTED

## 2023-01-12 ENCOUNTER — TRANSCRIPTION ENCOUNTER (OUTPATIENT)
Age: 58
End: 2023-01-12

## 2023-01-13 ENCOUNTER — TRANSCRIPTION ENCOUNTER (OUTPATIENT)
Age: 58
End: 2023-01-13

## 2023-01-13 ENCOUNTER — OUTPATIENT (OUTPATIENT)
Dept: OUTPATIENT SERVICES | Facility: HOSPITAL | Age: 58
LOS: 1 days | End: 2023-01-13
Payer: COMMERCIAL

## 2023-01-13 VITALS
HEART RATE: 65 BPM | OXYGEN SATURATION: 96 % | DIASTOLIC BLOOD PRESSURE: 78 MMHG | RESPIRATION RATE: 15 BRPM | TEMPERATURE: 98 F | SYSTOLIC BLOOD PRESSURE: 117 MMHG

## 2023-01-13 VITALS
HEIGHT: 64.5 IN | DIASTOLIC BLOOD PRESSURE: 69 MMHG | OXYGEN SATURATION: 96 % | WEIGHT: 159.17 LBS | SYSTOLIC BLOOD PRESSURE: 125 MMHG | TEMPERATURE: 98 F | HEART RATE: 80 BPM | RESPIRATION RATE: 15 BRPM

## 2023-01-13 DIAGNOSIS — M20.12 HALLUX VALGUS (ACQUIRED), LEFT FOOT: ICD-10-CM

## 2023-01-13 DIAGNOSIS — M20.42 OTHER HAMMER TOE(S) (ACQUIRED), LEFT FOOT: ICD-10-CM

## 2023-01-13 DIAGNOSIS — Z98.890 OTHER SPECIFIED POSTPROCEDURAL STATES: Chronic | ICD-10-CM

## 2023-01-13 DIAGNOSIS — Z86.69 PERSONAL HISTORY OF OTHER DISEASES OF THE NERVOUS SYSTEM AND SENSE ORGANS: Chronic | ICD-10-CM

## 2023-01-13 DIAGNOSIS — M24.575 CONTRACTURE, LEFT FOOT: ICD-10-CM

## 2023-01-13 DIAGNOSIS — Z90.09 ACQUIRED ABSENCE OF OTHER PART OF HEAD AND NECK: Chronic | ICD-10-CM

## 2023-01-13 DIAGNOSIS — M20.5X2 OTHER DEFORMITIES OF TOE(S) (ACQUIRED), LEFT FOOT: ICD-10-CM

## 2023-01-13 PROCEDURE — 73620 X-RAY EXAM OF FOOT: CPT

## 2023-01-13 PROCEDURE — 88311 DECALCIFY TISSUE: CPT | Mod: 26

## 2023-01-13 PROCEDURE — 28299 COR HLX VLGS DOUBLE OSTEOT: CPT | Mod: LT

## 2023-01-13 PROCEDURE — 28285 REPAIR OF HAMMERTOE: CPT | Mod: T1

## 2023-01-13 PROCEDURE — C1713: CPT

## 2023-01-13 PROCEDURE — 88304 TISSUE EXAM BY PATHOLOGIST: CPT | Mod: 26

## 2023-01-13 PROCEDURE — C1889: CPT

## 2023-01-13 PROCEDURE — 73620 X-RAY EXAM OF FOOT: CPT | Mod: 26,LT

## 2023-01-13 PROCEDURE — 88311 DECALCIFY TISSUE: CPT

## 2023-01-13 PROCEDURE — 28232 INCISION OF TOE TENDON: CPT | Mod: T3

## 2023-01-13 PROCEDURE — 88304 TISSUE EXAM BY PATHOLOGIST: CPT

## 2023-01-13 DEVICE — SCREW CORTEX S-T 2.7X14MM: Type: IMPLANTABLE DEVICE | Site: LEFT | Status: FUNCTIONAL

## 2023-01-13 DEVICE — WIRE K THRD TRC 0.045X6IN NS: Type: IMPLANTABLE DEVICE | Site: LEFT | Status: FUNCTIONAL

## 2023-01-13 DEVICE — SCREW CORT SELF TAP 2MM 12MM: Type: IMPLANTABLE DEVICE | Site: LEFT | Status: FUNCTIONAL

## 2023-01-13 DEVICE — SCREW CORT S-T 2X14MM: Type: IMPLANTABLE DEVICE | Site: LEFT | Status: FUNCTIONAL

## 2023-01-13 RX ORDER — HYDROMORPHONE HYDROCHLORIDE 2 MG/ML
1 INJECTION INTRAMUSCULAR; INTRAVENOUS; SUBCUTANEOUS
Refills: 0 | Status: DISCONTINUED | OUTPATIENT
Start: 2023-01-13 | End: 2023-01-13

## 2023-01-13 RX ORDER — SODIUM CHLORIDE 9 MG/ML
1000 INJECTION, SOLUTION INTRAVENOUS
Refills: 0 | Status: DISCONTINUED | OUTPATIENT
Start: 2023-01-13 | End: 2023-01-13

## 2023-01-13 RX ORDER — HYDROMORPHONE HYDROCHLORIDE 2 MG/ML
0.5 INJECTION INTRAMUSCULAR; INTRAVENOUS; SUBCUTANEOUS
Refills: 0 | Status: DISCONTINUED | OUTPATIENT
Start: 2023-01-13 | End: 2023-01-13

## 2023-01-13 RX ORDER — OMEPRAZOLE 10 MG/1
1 CAPSULE, DELAYED RELEASE ORAL
Qty: 0 | Refills: 0 | DISCHARGE

## 2023-01-13 RX ORDER — ONDANSETRON 8 MG/1
4 TABLET, FILM COATED ORAL ONCE
Refills: 0 | Status: DISCONTINUED | OUTPATIENT
Start: 2023-01-13 | End: 2023-01-13

## 2023-01-13 RX ORDER — ALBUTEROL 90 UG/1
2 AEROSOL, METERED ORAL
Qty: 0 | Refills: 0 | DISCHARGE

## 2023-01-13 RX ORDER — LEVOTHYROXINE SODIUM 125 MCG
1 TABLET ORAL
Qty: 0 | Refills: 0 | DISCHARGE

## 2023-01-13 RX ADMIN — SODIUM CHLORIDE 50 MILLILITER(S): 9 INJECTION, SOLUTION INTRAVENOUS at 08:37

## 2023-01-13 NOTE — ASU DISCHARGE PLAN (ADULT/PEDIATRIC) - PROCEDURE
Coltno/Adair, Mod. Lida, 2nd metatarsal osteotomy, Arthroplasty 2, T&C 3-5 left foot Colton/Adair, Mod. Lida, 2nd metatarsal osteotomy, Arthroplasty 2, T&C 3-5 left foot

## 2023-01-13 NOTE — BRIEF OPERATIVE NOTE - NSICDXBRIEFPOSTOP_GEN_ALL_CORE_FT
POST-OP DIAGNOSIS:  Bunion, left 13-Jan-2023 11:07:48  Dino Edwards  Hammertoe of left foot 13-Jan-2023 11:07:59  Dino Edwards

## 2023-01-13 NOTE — ASU DISCHARGE PLAN (ADULT/PEDIATRIC) - NS MD DC FALL RISK RISK
For information on Fall & Injury Prevention, visit: [email or web address] OR  [email or web address] OR  https://www.cdc.gov/steadi/patient.html

## 2023-01-13 NOTE — BRIEF OPERATIVE NOTE - NSICDXBRIEFPREOP_GEN_ALL_CORE_FT
PRE-OP DIAGNOSIS:  Bunion, left 13-Jan-2023 11:06:33  Dino Edwardsertoe of left foot 13-Jan-2023 11:07:16  Dino Edwards

## 2023-01-13 NOTE — ASU PATIENT PROFILE, ADULT - FALL HARM RISK - UNIVERSAL INTERVENTIONS
Bed in lowest position, wheels locked, appropriate side rails in place/Call bell, personal items and telephone in reach/Instruct patient to call for assistance before getting out of bed or chair/Non-slip footwear when patient is out of bed/[place] to call system/Physically safe environment - no spills, clutter or unnecessary equipment/Purposeful Proactive Rounding/Room/bathroom lighting operational, light cord in reach

## 2023-01-13 NOTE — ASU DISCHARGE PLAN (ADULT/PEDIATRIC) - CARE PROVIDER_API CALL
Dino Edwards (DPM)  Podiatric Medicine and Surgery  [address] [place] [address]  Phone: [phone]  Fax: [phone]  Follow Up Time:

## 2023-01-13 NOTE — BRIEF OPERATIVE NOTE - NSICDXBRIEFPROCEDURE_GEN_ALL_CORE_FT
PROCEDURES:  Colton-Adair bunionectomy 13-Jan-2023 11:06:04  Dino Edwards  Hammertoe repair 13-Jan-2023 11:06:15  Dino Edwards

## 2023-01-13 NOTE — ASU DISCHARGE PLAN (ADULT/PEDIATRIC) - NURSING INSTRUCTIONS
All discharge information reviewed with patient including pain, safety, medication and follow up care . Pt acknowledges understanding of discharge instructions. Instructed on use of cane.

## 2023-01-19 LAB — SURGICAL PATHOLOGY STUDY: SIGNIFICANT CHANGE UP

## 2023-03-08 NOTE — H&P PST ADULT - ENERGY EXPENDITURE (METS)
5 Lab Facility: 0 Performing Laboratory: [id number] Bill For Surgical Tray: no Expected Date Of Service: 03/08/2023 Billing Type: [employer]

## 2023-08-09 NOTE — ASU PREOPERATIVE ASSESSMENT, ADULT (IPARK ONLY) - HEART RATE (BEATS/MIN)
Resume your home medications. Leave white tape strips on. You may shower on Friday. You may take tylenol or ibuprofen for discomfort.
70

## 2023-09-05 PROBLEM — M20.42 OTHER HAMMER TOE(S) (ACQUIRED), LEFT FOOT: Chronic | Status: ACTIVE | Noted: 2023-01-03

## 2023-09-05 PROBLEM — M21.612 BUNION OF LEFT FOOT: Chronic | Status: ACTIVE | Noted: 2023-01-03

## 2023-10-31 ENCOUNTER — APPOINTMENT (OUTPATIENT)
Dept: MAMMOGRAPHY | Facility: CLINIC | Age: 58
End: 2023-10-31
Payer: COMMERCIAL

## 2023-10-31 ENCOUNTER — OUTPATIENT (OUTPATIENT)
Dept: OUTPATIENT SERVICES | Facility: HOSPITAL | Age: 58
LOS: 1 days | End: 2023-10-31
Payer: COMMERCIAL

## 2023-10-31 ENCOUNTER — APPOINTMENT (OUTPATIENT)
Dept: ULTRASOUND IMAGING | Facility: CLINIC | Age: 58
End: 2023-10-31
Payer: COMMERCIAL

## 2023-10-31 DIAGNOSIS — Z98.890 OTHER SPECIFIED POSTPROCEDURAL STATES: Chronic | ICD-10-CM

## 2023-10-31 DIAGNOSIS — Z12.31 ENCOUNTER FOR SCREENING MAMMOGRAM FOR MALIGNANT NEOPLASM OF BREAST: ICD-10-CM

## 2023-10-31 DIAGNOSIS — Z90.09 ACQUIRED ABSENCE OF OTHER PART OF HEAD AND NECK: Chronic | ICD-10-CM

## 2023-10-31 DIAGNOSIS — Z86.69 PERSONAL HISTORY OF OTHER DISEASES OF THE NERVOUS SYSTEM AND SENSE ORGANS: Chronic | ICD-10-CM

## 2023-10-31 PROCEDURE — 76641 ULTRASOUND BREAST COMPLETE: CPT | Mod: 26,50

## 2023-10-31 PROCEDURE — 77063 BREAST TOMOSYNTHESIS BI: CPT | Mod: 26

## 2023-10-31 PROCEDURE — 77067 SCR MAMMO BI INCL CAD: CPT | Mod: 26

## 2023-10-31 PROCEDURE — 77067 SCR MAMMO BI INCL CAD: CPT

## 2023-10-31 PROCEDURE — 76641 ULTRASOUND BREAST COMPLETE: CPT

## 2023-10-31 PROCEDURE — 77063 BREAST TOMOSYNTHESIS BI: CPT

## 2023-11-03 ENCOUNTER — APPOINTMENT (OUTPATIENT)
Dept: MAMMOGRAPHY | Facility: CLINIC | Age: 58
End: 2023-11-03
Payer: COMMERCIAL

## 2023-11-03 ENCOUNTER — OUTPATIENT (OUTPATIENT)
Dept: OUTPATIENT SERVICES | Facility: HOSPITAL | Age: 58
LOS: 1 days | End: 2023-11-03
Payer: COMMERCIAL

## 2023-11-03 ENCOUNTER — APPOINTMENT (OUTPATIENT)
Dept: ULTRASOUND IMAGING | Facility: CLINIC | Age: 58
End: 2023-11-03
Payer: COMMERCIAL

## 2023-11-03 DIAGNOSIS — Z98.890 OTHER SPECIFIED POSTPROCEDURAL STATES: Chronic | ICD-10-CM

## 2023-11-03 DIAGNOSIS — Z90.09 ACQUIRED ABSENCE OF OTHER PART OF HEAD AND NECK: Chronic | ICD-10-CM

## 2023-11-03 DIAGNOSIS — Z00.00 ENCOUNTER FOR GENERAL ADULT MEDICAL EXAMINATION WITHOUT ABNORMAL FINDINGS: ICD-10-CM

## 2023-11-03 DIAGNOSIS — Z86.69 PERSONAL HISTORY OF OTHER DISEASES OF THE NERVOUS SYSTEM AND SENSE ORGANS: Chronic | ICD-10-CM

## 2023-11-03 PROCEDURE — 77065 DX MAMMO INCL CAD UNI: CPT

## 2023-11-03 PROCEDURE — 77065 DX MAMMO INCL CAD UNI: CPT | Mod: 26,RT

## 2023-11-03 PROCEDURE — 76642 ULTRASOUND BREAST LIMITED: CPT | Mod: 26,RT

## 2023-11-03 PROCEDURE — G0279: CPT | Mod: 26

## 2023-11-03 PROCEDURE — G0279: CPT

## 2023-11-03 PROCEDURE — 76642 ULTRASOUND BREAST LIMITED: CPT

## 2023-12-12 ENCOUNTER — APPOINTMENT (OUTPATIENT)
Dept: ULTRASOUND IMAGING | Facility: CLINIC | Age: 58
End: 2023-12-12
Payer: COMMERCIAL

## 2023-12-12 ENCOUNTER — OUTPATIENT (OUTPATIENT)
Dept: OUTPATIENT SERVICES | Facility: HOSPITAL | Age: 58
LOS: 1 days | End: 2023-12-12
Payer: COMMERCIAL

## 2023-12-12 ENCOUNTER — APPOINTMENT (OUTPATIENT)
Dept: RADIOLOGY | Facility: CLINIC | Age: 58
End: 2023-12-12
Payer: COMMERCIAL

## 2023-12-12 DIAGNOSIS — Z98.890 OTHER SPECIFIED POSTPROCEDURAL STATES: Chronic | ICD-10-CM

## 2023-12-12 DIAGNOSIS — Z00.8 ENCOUNTER FOR OTHER GENERAL EXAMINATION: ICD-10-CM

## 2023-12-12 DIAGNOSIS — Z86.69 PERSONAL HISTORY OF OTHER DISEASES OF THE NERVOUS SYSTEM AND SENSE ORGANS: Chronic | ICD-10-CM

## 2023-12-12 DIAGNOSIS — Z90.09 ACQUIRED ABSENCE OF OTHER PART OF HEAD AND NECK: Chronic | ICD-10-CM

## 2023-12-12 PROCEDURE — 76536 US EXAM OF HEAD AND NECK: CPT

## 2023-12-12 PROCEDURE — 77080 DXA BONE DENSITY AXIAL: CPT | Mod: 26

## 2023-12-12 PROCEDURE — 77080 DXA BONE DENSITY AXIAL: CPT

## 2023-12-12 PROCEDURE — 76536 US EXAM OF HEAD AND NECK: CPT | Mod: 26

## 2024-01-04 ENCOUNTER — TRANSCRIPTION ENCOUNTER (OUTPATIENT)
Age: 59
End: 2024-01-04

## 2024-01-08 ENCOUNTER — TRANSCRIPTION ENCOUNTER (OUTPATIENT)
Age: 59
End: 2024-01-08

## 2024-01-10 ENCOUNTER — TRANSCRIPTION ENCOUNTER (OUTPATIENT)
Age: 59
End: 2024-01-10

## 2024-01-12 ENCOUNTER — TRANSCRIPTION ENCOUNTER (OUTPATIENT)
Age: 59
End: 2024-01-12

## 2024-01-26 ENCOUNTER — APPOINTMENT (OUTPATIENT)
Dept: ORTHOPEDIC SURGERY | Facility: CLINIC | Age: 59
End: 2024-01-26
Payer: COMMERCIAL

## 2024-01-26 DIAGNOSIS — S46.912A STRAIN OF UNSPECIFIED MUSCLE, FASCIA AND TENDON AT SHOULDER AND UPPER ARM LEVEL, LEFT ARM, INITIAL ENCOUNTER: ICD-10-CM

## 2024-01-26 DIAGNOSIS — M75.42 IMPINGEMENT SYNDROME OF LEFT SHOULDER: ICD-10-CM

## 2024-01-26 DIAGNOSIS — M25.512 PAIN IN LEFT SHOULDER: ICD-10-CM

## 2024-01-26 PROCEDURE — J3490M: CUSTOM | Mod: LT

## 2024-01-26 PROCEDURE — 99204 OFFICE O/P NEW MOD 45 MIN: CPT | Mod: 25

## 2024-01-26 PROCEDURE — 20611 DRAIN/INJ JOINT/BURSA W/US: CPT | Mod: LT

## 2024-01-26 PROCEDURE — 73030 X-RAY EXAM OF SHOULDER: CPT | Mod: LT

## 2024-01-26 RX ORDER — NAPROXEN 500 MG/1
500 TABLET ORAL
Qty: 60 | Refills: 2 | Status: ACTIVE | COMMUNITY
Start: 2024-01-26 | End: 1900-01-01

## 2024-01-26 NOTE — HISTORY OF PRESENT ILLNESS
[Left Arm] : left arm [Gradual] : gradual [Sudden] : sudden [6] : 6 [5] : 5 [Stabbing] : stabbing [Intermittent] : intermittent [Rest] : rest [Exercising] : exercising [Full time] : Work status: full time [de-identified] : 1/26/24:  left shoulder pain since september 2023.  no new injury or trauma.  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

## 2024-01-26 NOTE — PHYSICAL EXAM
[4 ___] : forward flexion 4[unfilled]/5 [3___] : abduction 3[unfilled]/5 [4___] : internal rotation 4[unfilled]/5 [] : no erythema [Left] : left shoulder [Calcific density] : Calcific density [de-identified] : active abduction 110 degrees [TWNoteComboBox7] : active forward flexion 145 degrees [TWNoteComboBox6] : internal rotation L5 [de-identified] : external rotation 50 degrees

## 2024-01-26 NOTE — ASSESSMENT
[FreeTextEntry1] : left shoulder pain since september 2023.  no new injury or trauma.  lateral pain and weakness present.  calcific tendonitis likely.  hypothyroid, gerd, osteoporosis

## 2024-01-26 NOTE — PROCEDURE
[Large Joint Injection] : Large joint injection [Left] : of the left [Subacromial Space] : subacromial space [Pain] : pain [Alcohol] : alcohol [Betadine] : betadine [___ cc    3mg] :  Betamethasone (Celestone) ~Vcc of 3mg [___ cc    1%] : Lidocaine ~Vcc of 1%  [___ cc    0.25%] : Bupivacaine (Marcaine) ~Vcc of 0.25%  [] : Patient tolerated procedure well [Call if redness, pain or fever occur] : call if redness, pain or fever occur [Apply ice for 15min out of every hour for the next 12-24 hours as tolerated] : apply ice for 15 minutes out of every hour for the next 12-24 hours as tolerated [Patient was advised to rest the joint(s) for ____ days] : patient was advised to rest the joint(s) for [unfilled] days [Previous OTC use and PT nontherapeutic] : patient has tried OTC's including aspirin, Ibuprofen, Aleve, etc or prescription NSAIDS, and/or exercises at home and/or physical therapy without satisfactory response [Patient had decreased mobility in the joint] : patient had decreased mobility in the joint [Risks, benefits, alternatives discussed / Verbal consent obtained] : the risks benefits, and alternatives have been discussed, and verbal consent was obtained [All ultrasound images have been permanently captured and stored accordingly in our picture archiving and communication system] : All ultrasound images have been permanently captured and stored accordingly in our picture archiving and communication system [Visualization of the needle and placement of injection was performed without complication] : visualization of the needle and placement of injection was performed without complication [Inflammation] : inflammation [de-identified] : for accurate placement of needle into sa space.

## 2024-01-29 ENCOUNTER — TRANSCRIPTION ENCOUNTER (OUTPATIENT)
Age: 59
End: 2024-01-29

## 2024-01-30 ENCOUNTER — TRANSCRIPTION ENCOUNTER (OUTPATIENT)
Age: 59
End: 2024-01-30

## 2024-03-08 ENCOUNTER — APPOINTMENT (OUTPATIENT)
Dept: ORTHOPEDIC SURGERY | Facility: CLINIC | Age: 59
End: 2024-03-08
Payer: COMMERCIAL

## 2024-03-08 ENCOUNTER — APPOINTMENT (OUTPATIENT)
Dept: MRI IMAGING | Facility: CLINIC | Age: 59
End: 2024-03-08
Payer: COMMERCIAL

## 2024-03-08 DIAGNOSIS — M62.838 OTHER MUSCLE SPASM: ICD-10-CM

## 2024-03-08 PROCEDURE — 73221 MRI JOINT UPR EXTREM W/O DYE: CPT | Mod: LT

## 2024-03-08 PROCEDURE — 99213 OFFICE O/P EST LOW 20 MIN: CPT

## 2024-03-08 NOTE — PHYSICAL EXAM
[4 ___] : forward flexion 4[unfilled]/5 [Left] : left shoulder [Calcific density] : Calcific density [] : no erythema [4___] : abduction 4[unfilled]/5 [TWNoteComboBox7] : active forward flexion 170 degrees [de-identified] : active abduction 150 degrees [TWNoteComboBox6] : internal rotation L5 [de-identified] : external rotation 65 degrees

## 2024-03-08 NOTE — ASSESSMENT
[FreeTextEntry1] : left shoulder pain since september 2023.  no new injury or trauma.  lateral pain and weakness present.  calcific tendonitis likely.  csi last visit 1/26/24 with some relief.   mild pain.  also weakness.  possible cuff tear  neck with left trap pain.  likely muscular.    also bilateral cts  hypothyroid, gerd, osteoporosis

## 2024-03-08 NOTE — DISCUSSION/SUMMARY
[de-identified] : mri left shoulder to assess.  pt left shoulder and neck.  follow up with results.   Progress Note completed by Gaviota Ayala PA-C * Dr. Jules -- The documentation recorded in this note accurately reflects the decisions made by me during this visit.

## 2024-03-08 NOTE — HISTORY OF PRESENT ILLNESS
[Left Arm] : left arm [Gradual] : gradual [Sudden] : sudden [6] : 6 [Stabbing] : stabbing [5] : 5 [Intermittent] : intermittent [Rest] : rest [Exercising] : exercising [Full time] : Work status: full time [de-identified] : 1/26/24:  left shoulder pain since september 2023.  no new injury or trauma.  3/8/24:  follow up left shoulder.  improving with pt.  mild pain.  csi with some relief.  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

## 2024-03-15 ENCOUNTER — APPOINTMENT (OUTPATIENT)
Dept: ORTHOPEDIC SURGERY | Facility: CLINIC | Age: 59
End: 2024-03-15
Payer: COMMERCIAL

## 2024-03-15 DIAGNOSIS — S46.012D STRAIN OF MUSCLE(S) AND TENDON(S) OF THE ROTATOR CUFF OF LEFT SHOULDER, SUBSEQUENT ENCOUNTER: ICD-10-CM

## 2024-03-15 PROCEDURE — 99214 OFFICE O/P EST MOD 30 MIN: CPT

## 2024-03-15 NOTE — HISTORY OF PRESENT ILLNESS
[Left Arm] : left arm [Sudden] : sudden [Gradual] : gradual [5] : 5 [6] : 6 [Stabbing] : stabbing [Intermittent] : intermittent [Rest] : rest [Exercising] : exercising [Full time] : Work status: full time [de-identified] : 1/26/24:  left shoulder pain since september 2023.  no new injury or trauma.  3/8/24:  follow up left shoulder.  improving with pt.  mild pain.  csi with some relief.  3/15/24: back to review mri. 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

## 2024-03-15 NOTE — ASSESSMENT
[FreeTextEntry1] : left shoulder pain since september 2023.  no new injury or trauma.  mri shows ptrct subscap, small calcium.  csi last visit 1/26/24 with some relief.   improved with PT but still has pain.  neck with left trap pain.  likely muscular.    also bilateral cts  hypothyroid, gerd, osteoporosis

## 2024-03-27 ENCOUNTER — APPOINTMENT (OUTPATIENT)
Dept: PHARMACY | Facility: CLINIC | Age: 59
End: 2024-03-27
Payer: SELF-PAY

## 2024-03-27 PROCEDURE — V5267D: CUSTOM

## 2024-04-26 ENCOUNTER — APPOINTMENT (OUTPATIENT)
Dept: ORTHOPEDIC SURGERY | Facility: CLINIC | Age: 59
End: 2024-04-26
Payer: COMMERCIAL

## 2024-04-26 PROCEDURE — 99214 OFFICE O/P EST MOD 30 MIN: CPT | Mod: 25

## 2024-04-26 PROCEDURE — J3490M: CUSTOM

## 2024-04-26 PROCEDURE — 20611 DRAIN/INJ JOINT/BURSA W/US: CPT | Mod: LT

## 2024-04-26 NOTE — ASSESSMENT
[FreeTextEntry1] : left shoulder pain since september 2023.  mri shows ptrct subscap, small calcium.  csi last visit 1/26/24 with some relief.  recurrent pain.  neck with left trap pain.  likely muscular.    also bilateral cts  hypothyroid, gerd, osteoporosis

## 2024-04-26 NOTE — PHYSICAL EXAM
[4 ___] : forward flexion 4[unfilled]/5 [4___] : internal rotation 4[unfilled]/5 [Left] : left shoulder [Calcific density] : Calcific density [] : no erythema [TWNoteComboBox7] : active forward flexion 170 degrees [de-identified] : active abduction 150 degrees [TWNoteComboBox6] : internal rotation L5 [de-identified] : external rotation 65 degrees

## 2024-04-26 NOTE — HISTORY OF PRESENT ILLNESS
[Left Arm] : left arm [Gradual] : gradual [Sudden] : sudden [6] : 6 [5] : 5 [Stabbing] : stabbing [Intermittent] : intermittent [Rest] : rest [Exercising] : exercising [Full time] : Work status: full time [de-identified] : 1/26/24:  left shoulder pain since september 2023.  no new injury or trauma.  3/8/24:  follow up left shoulder.  improving with pt.  mild pain.  csi with some relief.  3/15/24: back to review mri. 4/26/24: continues with L shoulder pain, naprosyn prn. doing PT for the neck and shoulder. interested in repeat inj. n/t in the hands at times.  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

## 2024-04-26 NOTE — PROCEDURE
[Large Joint Injection] : Large joint injection [Left] : of the left [Subacromial Space] : subacromial space [Pain] : pain [Alcohol] : alcohol [___ cc    3mg] :  Betamethasone (Celestone) ~Vcc of 3mg [Betadine] : betadine [___ cc    1%] : Lidocaine ~Vcc of 1%  [___ cc    0.25%] : Bupivacaine (Marcaine) ~Vcc of 0.25%  [] : Patient tolerated procedure well [Call if redness, pain or fever occur] : call if redness, pain or fever occur [Patient was advised to rest the joint(s) for ____ days] : patient was advised to rest the joint(s) for [unfilled] days [Apply ice for 15min out of every hour for the next 12-24 hours as tolerated] : apply ice for 15 minutes out of every hour for the next 12-24 hours as tolerated [Previous OTC use and PT nontherapeutic] : patient has tried OTC's including aspirin, Ibuprofen, Aleve, etc or prescription NSAIDS, and/or exercises at home and/or physical therapy without satisfactory response [Patient had decreased mobility in the joint] : patient had decreased mobility in the joint [Risks, benefits, alternatives discussed / Verbal consent obtained] : the risks benefits, and alternatives have been discussed, and verbal consent was obtained [All ultrasound images have been permanently captured and stored accordingly in our picture archiving and communication system] : All ultrasound images have been permanently captured and stored accordingly in our picture archiving and communication system [Visualization of the needle and placement of injection was performed without complication] : visualization of the needle and placement of injection was performed without complication [Inflammation] : inflammation [de-identified] : for accurate placement of needle into sa space

## 2024-05-01 ENCOUNTER — APPOINTMENT (OUTPATIENT)
Dept: PHARMACY | Facility: CLINIC | Age: 59
End: 2024-05-01
Payer: SELF-PAY

## 2024-05-01 ENCOUNTER — APPOINTMENT (OUTPATIENT)
Dept: SPEECH THERAPY | Facility: CLINIC | Age: 59
End: 2024-05-01

## 2024-05-01 ENCOUNTER — OUTPATIENT (OUTPATIENT)
Dept: OUTPATIENT SERVICES | Facility: HOSPITAL | Age: 59
LOS: 1 days | Discharge: HOME | End: 2024-05-01

## 2024-05-01 DIAGNOSIS — Z98.890 OTHER SPECIFIED POSTPROCEDURAL STATES: Chronic | ICD-10-CM

## 2024-05-01 DIAGNOSIS — Z90.09 ACQUIRED ABSENCE OF OTHER PART OF HEAD AND NECK: Chronic | ICD-10-CM

## 2024-05-01 DIAGNOSIS — Z86.69 PERSONAL HISTORY OF OTHER DISEASES OF THE NERVOUS SYSTEM AND SENSE ORGANS: Chronic | ICD-10-CM

## 2024-05-01 PROCEDURE — V5299A: CUSTOM

## 2024-05-06 NOTE — PROCEDURE
[226 Hz] : 226 Hz [Normal Eardrum Mobility] : consistent with normal eardrum mobility [Type Ad Tympanogram] : Type Ad Hypermobile [] : Audiogram: [] : Complete Audiological Evaluation [Good] : good [de-identified] : Essentially mild to severe sensorineural hearing loss, right ear, and an essentially moderately-severe to severe mixed hearing loss, left ear. Excellent speech recognition scores, bilaterally.

## 2024-05-06 NOTE — ASSESSMENT
[FreeTextEntry1] : Results of today's audiologic evaluation consistent with an essentially mild to severe sensorineural hearing loss, right ear, and an essentially moderately-severe to severe mixed hearing loss, left ear. Excellent speech recognition scores, bilaterally. Thresholds stable in right ear re: previous audio and improved thresholds noted in the left ear re: previous audio. Thresholds rechecked with inserts and headphones.  Results and recommendations were reviewed with patient who indicated understanding to all. Answered all of patient's questions to their satisfaction. Provided patient with a copy of today's evaluation.

## 2024-05-06 NOTE — PLAN
[FreeTextEntry2] : 1. ENT consult re: reports of dizziness.  2. Continued use of current amplification. HAC following today's evaluation.  3.  Annual audiological evaluation, sooner if change in hearing suspected, otherwise as medically indicated.

## 2024-05-06 NOTE — HISTORY OF PRESENT ILLNESS
[FreeTextEntry1] : Noemy is a 59 year old female seen today for an audiologic evaluation. Patient presents with a mixed hearing loss in the left ear and a sensorineural hearing loss in the right ear.   Pt currently aided with binaural hearing aids from our dispensary. Previous evaluation performed 3/2022. Medical hx bilateral stapedectomy; Dx SSCD. Today, patient notes possible change in hearing from left ear and reports an imbalance sensation when changing positions. Denies aural fullness and otalgia.

## 2024-05-08 DIAGNOSIS — H90.6 MIXED CONDUCTIVE AND SENSORINEURAL HEARING LOSS, BILATERAL: ICD-10-CM

## 2024-06-21 ENCOUNTER — APPOINTMENT (OUTPATIENT)
Dept: ORTHOPEDIC SURGERY | Facility: CLINIC | Age: 59
End: 2024-06-21
Payer: COMMERCIAL

## 2024-06-21 DIAGNOSIS — M75.32 CALCIFIC TENDINITIS OF LEFT SHOULDER: ICD-10-CM

## 2024-06-21 PROCEDURE — 99213 OFFICE O/P EST LOW 20 MIN: CPT

## 2024-06-21 NOTE — HISTORY OF PRESENT ILLNESS
[Left Arm] : left arm [Gradual] : gradual [Sudden] : sudden [6] : 6 [5] : 5 [Stabbing] : stabbing [Intermittent] : intermittent [Rest] : rest [Exercising] : exercising [Full time] : Work status: full time [de-identified] : 1/26/24:  left shoulder pain since september 2023.  no new injury or trauma.  3/8/24:  follow up left shoulder.  improving with pt.  mild pain.  csi with some relief.  3/15/24: back to review mri. 4/26/24: continues with L shoulder pain, naprosyn prn. doing PT for the neck and shoulder. interested in repeat inj. n/t in the hands at times.  6/21/24: improved. 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

## 2024-06-21 NOTE — ASSESSMENT
[FreeTextEntry1] : left shoulder pain since september 2023.  mri shows ptrct subscap, small calcium.  improved. 2 csi so far.  neck with left trap pain.  likely muscular.    also bilateral cts  hypothyroid, gerd, osteoporosis

## 2024-06-21 NOTE — PHYSICAL EXAM
[Left] : left shoulder [Calcific density] : Calcific density [] : no erythema [5 ___] : forward flexion 5[unfilled]/5 [5___] : internal rotation 5[unfilled]/5 [TWNoteComboBox7] : active forward flexion 170 degrees [de-identified] : active abduction 150 degrees [TWNoteComboBox6] : internal rotation L5 [de-identified] : external rotation 30 degrees

## 2024-08-02 ENCOUNTER — APPOINTMENT (OUTPATIENT)
Dept: RHEUMATOLOGY | Facility: CLINIC | Age: 59
End: 2024-08-02
Payer: COMMERCIAL

## 2024-08-02 VITALS
OXYGEN SATURATION: 98 % | HEART RATE: 76 BPM | SYSTOLIC BLOOD PRESSURE: 128 MMHG | DIASTOLIC BLOOD PRESSURE: 83 MMHG | HEIGHT: 64 IN

## 2024-08-02 DIAGNOSIS — M25.50 PAIN IN UNSPECIFIED JOINT: ICD-10-CM

## 2024-08-02 DIAGNOSIS — M35.9 SYSTEMIC INVOLVEMENT OF CONNECTIVE TISSUE, UNSPECIFIED: ICD-10-CM

## 2024-08-02 DIAGNOSIS — R76.8 OTHER SPECIFIED ABNORMAL IMMUNOLOGICAL FINDINGS IN SERUM: ICD-10-CM

## 2024-08-02 PROCEDURE — 99205 OFFICE O/P NEW HI 60 MIN: CPT

## 2024-08-03 LAB
ALBUMIN SERPL ELPH-MCNC: 4.8 G/DL
ALP BLD-CCNC: 76 U/L
ALT SERPL-CCNC: 17 U/L
ANION GAP SERPL CALC-SCNC: 16 MMOL/L
AST SERPL-CCNC: 21 U/L
BASOPHILS # BLD AUTO: 0.03 K/UL
BASOPHILS NFR BLD AUTO: 0.8 %
BILIRUB SERPL-MCNC: 0.6 MG/DL
BUN SERPL-MCNC: 17 MG/DL
CALCIUM SERPL-MCNC: 9.7 MG/DL
CHLORIDE SERPL-SCNC: 110 MMOL/L
CO2 SERPL-SCNC: 22 MMOL/L
CREAT SERPL-MCNC: 0.63 MG/DL
CREAT SPEC-SCNC: 81 MG/DL
CREAT/PROT UR: 0.1 RATIO
CRP SERPL-MCNC: <3 MG/L
EGFR: 101 ML/MIN/1.73M2
EOSINOPHIL # BLD AUTO: 0.05 K/UL
EOSINOPHIL NFR BLD AUTO: 1.3 %
ERYTHROCYTE [SEDIMENTATION RATE] IN BLOOD BY WESTERGREN METHOD: 19 MM/HR
HCT VFR BLD CALC: 43.1 %
HGB BLD-MCNC: 14.6 G/DL
IMM GRANULOCYTES NFR BLD AUTO: 0.3 %
LYMPHOCYTES # BLD AUTO: 1 K/UL
LYMPHOCYTES NFR BLD AUTO: 25.8 %
MAN DIFF?: NORMAL
MCHC RBC-ENTMCNC: 33.9 GM/DL
MCHC RBC-ENTMCNC: 34.1 PG
MCV RBC AUTO: 100.7 FL
MONOCYTES # BLD AUTO: 0.23 K/UL
MONOCYTES NFR BLD AUTO: 5.9 %
NEUTROPHILS # BLD AUTO: 2.56 K/UL
NEUTROPHILS NFR BLD AUTO: 65.9 %
PLATELET # BLD AUTO: 155 K/UL
POTASSIUM SERPL-SCNC: 4.4 MMOL/L
PROT SERPL-MCNC: 7.8 G/DL
PROT UR-MCNC: 7 MG/DL
RBC # BLD: 4.28 M/UL
RBC # FLD: 12.9 %
RHEUMATOID FACT SER QL: <10 IU/ML
SODIUM SERPL-SCNC: 148 MMOL/L
WBC # FLD AUTO: 3.88 K/UL

## 2024-08-04 LAB
DSDNA AB SER-ACNC: <1 IU/ML
ENA SCL70 IGG SER IA-ACNC: <0.2 AL
ENA SS-A AB SER IA-ACNC: <0.2 AL
ENA SS-B AB SER IA-ACNC: <0.2 AL

## 2024-08-05 LAB — RNA POLYMERASE III IGG: 11 UNITS

## 2024-08-06 LAB
C3 SERPL-MCNC: 152 MG/DL
C4 SERPL-MCNC: 26 MG/DL

## 2024-08-07 LAB
ANA PAT FLD IF-IMP: ABNORMAL
ANA SER IF-ACNC: ABNORMAL

## 2024-08-07 NOTE — HISTORY OF PRESENT ILLNESS
[FreeTextEntry1] :  Ms Pryor is a non-smoking woman PMHX Hashimoto's and Otosclerosis related hearing loss known to our office for polyarthralgia and positive SHANNA  Patient known to me - last visit May 2021  INTERVAL Hx  recently with covid  - describes some hoarsens - slight brito - but not bad  - recuperating well  - in terms of the joint issues  - intermittently worse than others  - wrists, shoulders  - sometimes cannot hold hte steering wheel that long  - the left shoudle r- went to the pt - a partial tear an dcalcificaiton - pt wa sokay and celined surgeyr  - - pain used to be only in the am - now it is with the day - middle of the day  you feel time  - menopause -  - gluten free now - and not so swoleln  weight loss and now stable at 132 poudns and feels good with that  - has developed no s/s/ of lupus - but wants to be evaluated to assess interval worsening  in terms of OP  - infusions endo for 30 years  - was told had to be  - they were supposed to get improved paperwork  - JAGDISH PARR - ENDO - NHP at [facility] ([facility])   Rheum ROS  - denies RP, sicca, oral ulcers, rashes, photosensitivity.    - denies skin tightening, ulcerations, nodules - denies constitutional symptoms, fatigue, night sweats.  weight is stable  - Denies psoriasis, IBD, Inflammatory eye disease, STD, infectious diarrhea - breathes well without h/o of pleuritis, pericarditis.  renal function is normal and urine is not frothy - muscles are strong and there is no neurologic issues - no headaches, jaw pain with chewing, visual loss, scalp tenderness or double vision  ____________________________________ Background 1. Positive SHANNA -found to have positive SHANNA 10 years ago and was seen by Dr. Woodson.  she was told that she doesn't meet criteria for lupus at that times.   2. Joint pain--pain in the hips, hands.  Stiffness lasts up to one hour in the morning; used to experience swelling in the hands but that improved since changing to gluten free diet. Naproxen helps the pain and patient used periodically.  After several days of naproxen upsets her stomach. Associated fatigue and occasional brain fog.  s/p foot surgery and was told that she has arthritis stretching helps the pain sleep is ok, but wakes not refreshed.  3. Skin issues--redness on the cheeks, nose, chin, never scars.  Patient was seen by dermatology and was told that it was not rosacea

## 2024-08-07 NOTE — HISTORY OF PRESENT ILLNESS
[FreeTextEntry1] :  Ms Pryor is a non-smoking woman PMHX Hashimoto's and Otosclerosis related hearing loss known to our office for polyarthralgia and positive SHANNA  Patient known to me - last visit May 2021  INTERVAL Hx  recently with covid  - describes some hoarsens - slight brito - but not bad  - recuperating well  - in terms of the joint issues  - intermittently worse than others  - wrists, shoulders  - sometimes cannot hold hte steering wheel that long  - the left shoudle r- went to the pt - a partial tear an dcalcificaiton - pt wa sokay and celined surgeyr  - - pain used to be only in the am - now it is with the day - middle of the day  you feel time  - menopause -  - gluten free now - and not so swoleln  weight loss and now stable at 132 poudns and feels good with that  - has developed no s/s/ of lupus - but wants to be evaluated to assess interval worsening  in terms of OP  - infusions endo for 30 years  - was told had to be  - they were supposed to get improved paperwork  - JAGDISH PARR - ENDO - NHP at [facility] ([facility])   Rheum ROS  - denies RP, sicca, oral ulcers, rashes, photosensitivity.    - denies skin tightening, ulcerations, nodules - denies constitutional symptoms, fatigue, night sweats.  weight is stable  - Denies psoriasis, IBD, Inflammatory eye disease, STD, infectious diarrhea - breathes well without h/o of pleuritis, pericarditis.  renal function is normal and urine is not frothy - muscles are strong and there is no neurologic issues - no headaches, jaw pain with chewing, visual loss, scalp tenderness or double vision  ____________________________________ Background 1. Positive SHANNA -found to have positive SHANNA 10 years ago and was seen by Dr. Woodson.  she was told that she doesn't meet criteria for lupus at that times.   2. Joint pain--pain in the hips, hands.  Stiffness lasts up to one hour in the morning; used to experience swelling in the hands but that improved since changing to gluten free diet. Naproxen helps the pain and patient used periodically.  After several days of naproxen upsets her stomach. Associated fatigue and occasional brain fog.  s/p foot surgery and was told that she has arthritis stretching helps the pain sleep is ok, but wakes not refreshed.  3. Skin issues--redness on the cheeks, nose, chin, never scars.  Patient was seen by dermatology and was told that it was not rosacea

## 2024-08-07 NOTE — PHYSICAL EXAM
[General Appearance - Alert] : alert [General Appearance - In No Acute Distress] : in no acute distress [General Appearance - Well Nourished] : well nourished [General Appearance - Well Developed] : well developed [General Appearance - Well-Appearing] : healthy appearing [Oriented To Time, Place, And Person] : oriented to person, place, and time [Impaired Insight] : insight and judgment were intact [Affect] : the affect was normal [Respiration, Rhythm And Depth] : normal respiratory rhythm and effort [FreeTextEntry1] : no RP< no LR, no edema [Skin Color & Pigmentation] : normal skin color and pigmentation [Skin Turgor] : normal skin turgor [] : no rash [Abnormal Walk] : normal gait [Nail Clubbing] : no clubbing  or cyanosis of the fingernails [Musculoskeletal - Swelling] : no joint swelling seen [Motor Tone] : muscle strength and tone were normal

## 2024-08-07 NOTE — ASSESSMENT
[FreeTextEntry1] : .     Ms. 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  doesn't make criteria for a specific CTD thought with marked AMS and polyarthritis.  Other associated symptoms included oral and nasal ulcers, rash, raynauds.  this in the context of autoimmune thyroid idsase. - NSAID trial ineffective completely - here to assess progression/evolution  -- check inflamamtory markers  -- check serologies/ subserolgoeis   # covid  -- recuperating  -- patient aware to contact PCP if symptoms worsen or if some of the rhinitis/breathing issues dont' keyla/resolved  More than 50% of the encounter was spent counseling the patient on differential, workup, disease course and treatment/management.  Education was provided to the patient during this encounter.  All questions and concerns were addressed and answered.   The patient verbalized understanding and agreed to the plan.   Patient has been instructed to call for an appointment if new symptoms develop. Patient has been instructed to make a follow-up appointment in 3 months.   Time spent on the encounter included, but is not limited to, preparing to see the patient, obtaining and/or reviewing separately obtained history, performing the evaluation, counseling and educating, independently interpreting results with communication to patient, order placement, referring and/or communicating with other health professionals as described, and documenting clinical information in the electronic health record

## 2024-08-08 LAB
ANTI-BETA2 GLYCOPROTEIN 1 IGG CONCENTRATION: 2 U/ML
ANTI-BETA2 GLYCOPROTEIN 1 IGM CONCENTRATION: <2.4 U/ML
ANTI-CARDIOLIPIN IGG CONCENTRATION: 2.9 GPL
ANTI-CARDIOLIPIN IGM CONCENTRATION: 5.6 MPL
ANTI-CENP IGG CONCENTRATION: 0.5 U/ML
ANTI-CYCLIC CITRULLINATED PEPTIDE IGG CONCENTRATION: 0.9 U/ML
ANTI-DOUBLE-STRANDED DNA IGG CONCENTRATION: 57.42 IU/ML
ANTI-JO-1 IGG CONCENTRATION: <0.3 U/ML
ANTI-NUCLEAR ANTIBODIES - CYTOPLASMIC PATTERN: NORMAL
ANTI-NUCLEAR ANTIBODIES - PRIMARY NUCLEAR PATTERN: NORMAL
ANTI-NUCLEAR ANTIBODIES - PRIMARY PATTERN TITER: ABNORMAL
ANTI-NUCLEAR ANTIBODIES IGG CONCENTRATION: 19.75 UNITS
ANTI-RNA POL III IGG CONCENTRATION: <0.7 U/ML
ANTI-RNP70 IGG CONCENTRATION: 2.8 U/ML
ANTI-RO52 IGG CONCENTRATION: 0.6 U/ML
ANTI-RO60 IGG CONCENTRATION: <0.4 U/ML
ANTI-SCL-70 IGG CONCENTRATION: 1.7 U/ML
ANTI-SMITH IGG CONCENTRATION: 1.2 U/ML
ANTI-SS-B (LA) IGG CONCENTRATION: 0.5 U/ML
ANTI-THYROGLOBULIN IGG CONCENTRATION: 26 IU/ML
ANTI-THYROID PEROXIDASE IGG CONCENTRATION: 64 IU/ML
ANTI-U1RNP IGG CONCENTRATION: 1.7 U/ML
AVISE LUPUS RESULT: NORMAL
B-LYMPHOCYTE-BOUND C4D (BC4D) LEVEL: NORMAL
ERYTHROCYTE-BOUND C4D (EC4D) LEVEL: 5
RHEUMATOID FACTOR (IGA) CONCENTRATION: 4.3 IU/ML
RHEUMATOID FACTOR (IGM) CONCENTRATION: <0.6 IU/ML

## 2024-10-03 ENCOUNTER — APPOINTMENT (OUTPATIENT)
Dept: ULTRASOUND IMAGING | Facility: CLINIC | Age: 59
End: 2024-10-03
Payer: COMMERCIAL

## 2024-10-03 PROCEDURE — 76775 US EXAM ABDO BACK WALL LIM: CPT | Mod: 26

## 2024-10-03 NOTE — ASU DISCHARGE PLAN (ADULT/PEDIATRIC). - FOLLOWUP APPOINTMENT CLINIC/PHYSICIAN
----- Message from Jam sent at 10/3/2024 12:30 PM CDT -----  Contact: self   .Type: Patient Call Back        Who called:   Patient      What is the request in detail:    Called in returning a missed call from Janette  . Please call back   Can the clinic reply by [facility]?           Would the patient rather a call back or a response via [facility]?   call      Best call back number:  [phone]  
Reach out to pt from the messages. No reason given. Pt did not answer left v.m    
Dr. Edwards 07/24/18

## 2024-11-07 ENCOUNTER — APPOINTMENT (OUTPATIENT)
Dept: MAMMOGRAPHY | Facility: CLINIC | Age: 59
End: 2024-11-07

## 2024-11-07 ENCOUNTER — OUTPATIENT (OUTPATIENT)
Dept: OUTPATIENT SERVICES | Facility: HOSPITAL | Age: 59
LOS: 1 days | End: 2024-11-07
Payer: COMMERCIAL

## 2024-11-07 ENCOUNTER — APPOINTMENT (OUTPATIENT)
Dept: ULTRASOUND IMAGING | Facility: CLINIC | Age: 59
End: 2024-11-07

## 2024-11-07 DIAGNOSIS — Z98.890 OTHER SPECIFIED POSTPROCEDURAL STATES: Chronic | ICD-10-CM

## 2024-11-07 DIAGNOSIS — Z00.8 ENCOUNTER FOR OTHER GENERAL EXAMINATION: ICD-10-CM

## 2024-11-07 DIAGNOSIS — Z90.09 ACQUIRED ABSENCE OF OTHER PART OF HEAD AND NECK: Chronic | ICD-10-CM

## 2024-11-07 PROCEDURE — 77067 SCR MAMMO BI INCL CAD: CPT | Mod: 26

## 2024-11-07 PROCEDURE — 77063 BREAST TOMOSYNTHESIS BI: CPT | Mod: 26

## 2024-11-07 PROCEDURE — 76641 ULTRASOUND BREAST COMPLETE: CPT | Mod: 26,50

## 2024-11-07 PROCEDURE — 76641 ULTRASOUND BREAST COMPLETE: CPT

## 2024-11-07 PROCEDURE — 77063 BREAST TOMOSYNTHESIS BI: CPT

## 2024-11-07 PROCEDURE — 77067 SCR MAMMO BI INCL CAD: CPT

## 2025-03-10 ENCOUNTER — APPOINTMENT (OUTPATIENT)
Dept: RADIOLOGY | Facility: CLINIC | Age: 60
End: 2025-03-10
Payer: COMMERCIAL

## 2025-03-10 ENCOUNTER — OUTPATIENT (OUTPATIENT)
Dept: OUTPATIENT SERVICES | Facility: HOSPITAL | Age: 60
LOS: 1 days | End: 2025-03-10
Payer: COMMERCIAL

## 2025-03-10 DIAGNOSIS — Z98.890 OTHER SPECIFIED POSTPROCEDURAL STATES: Chronic | ICD-10-CM

## 2025-03-10 DIAGNOSIS — Z90.09 ACQUIRED ABSENCE OF OTHER PART OF HEAD AND NECK: Chronic | ICD-10-CM

## 2025-03-10 DIAGNOSIS — Z86.69 PERSONAL HISTORY OF OTHER DISEASES OF THE NERVOUS SYSTEM AND SENSE ORGANS: Chronic | ICD-10-CM

## 2025-03-10 DIAGNOSIS — M81.0 AGE-RELATED OSTEOPOROSIS WITHOUT CURRENT PATHOLOGICAL FRACTURE: ICD-10-CM

## 2025-03-10 PROCEDURE — 77080 DXA BONE DENSITY AXIAL: CPT

## 2025-03-10 PROCEDURE — 77080 DXA BONE DENSITY AXIAL: CPT | Mod: 26

## 2025-03-12 ENCOUNTER — TRANSCRIPTION ENCOUNTER (OUTPATIENT)
Age: 60
End: 2025-03-12

## 2025-03-13 ENCOUNTER — TRANSCRIPTION ENCOUNTER (OUTPATIENT)
Age: 60
End: 2025-03-13

## 2025-03-20 ENCOUNTER — TRANSCRIPTION ENCOUNTER (OUTPATIENT)
Age: 60
End: 2025-03-20

## 2025-05-06 ENCOUNTER — NON-APPOINTMENT (OUTPATIENT)
Age: 60
End: 2025-05-06

## 2025-05-07 ENCOUNTER — APPOINTMENT (OUTPATIENT)
Dept: PHARMACY | Facility: CLINIC | Age: 60
End: 2025-05-07
Payer: SELF-PAY

## 2025-05-07 ENCOUNTER — OUTPATIENT (OUTPATIENT)
Dept: OUTPATIENT SERVICES | Facility: HOSPITAL | Age: 60
LOS: 1 days | End: 2025-05-07
Payer: COMMERCIAL

## 2025-05-07 ENCOUNTER — APPOINTMENT (OUTPATIENT)
Dept: ULTRASOUND IMAGING | Facility: CLINIC | Age: 60
End: 2025-05-07
Payer: COMMERCIAL

## 2025-05-07 DIAGNOSIS — Z98.890 OTHER SPECIFIED POSTPROCEDURAL STATES: Chronic | ICD-10-CM

## 2025-05-07 DIAGNOSIS — Z86.69 PERSONAL HISTORY OF OTHER DISEASES OF THE NERVOUS SYSTEM AND SENSE ORGANS: Chronic | ICD-10-CM

## 2025-05-07 DIAGNOSIS — Z00.00 ENCOUNTER FOR GENERAL ADULT MEDICAL EXAMINATION WITHOUT ABNORMAL FINDINGS: ICD-10-CM

## 2025-05-07 DIAGNOSIS — Z90.09 ACQUIRED ABSENCE OF OTHER PART OF HEAD AND NECK: Chronic | ICD-10-CM

## 2025-05-07 PROCEDURE — 76642 ULTRASOUND BREAST LIMITED: CPT | Mod: 26,RT

## 2025-05-07 PROCEDURE — V5090: CPT

## 2025-05-07 PROCEDURE — 76642 ULTRASOUND BREAST LIMITED: CPT

## 2025-05-08 ENCOUNTER — APPOINTMENT (OUTPATIENT)
Dept: SPEECH THERAPY | Facility: CLINIC | Age: 60
End: 2025-05-08

## 2025-05-23 ENCOUNTER — APPOINTMENT (OUTPATIENT)
Dept: PHARMACY | Facility: CLINIC | Age: 60
End: 2025-05-23
Payer: SELF-PAY

## 2025-05-23 PROCEDURE — V5267D: CUSTOM

## 2025-05-23 PROCEDURE — V5299A: CUSTOM

## 2025-07-10 NOTE — ASU PREOP CHECKLIST - TEMPERATURE IN CELSIUS (DEGREES C)
Spoke with Emelia, she will reach out to family to get them an appointment for the shoe lift as per Dr. Song.  
36.7

## (undated) DEVICE — SHOE  POSTOP SOFTIE DARCO M-LG

## (undated) DEVICE — GLV 6.5 PROTEXIS (WHITE)

## (undated) DEVICE — GLV 7 PROTEXIS (WHITE)

## (undated) DEVICE — CANISTER SUCTION LID GUARD 3000CC

## (undated) DEVICE — NDL HYPO REGULAR BEVEL 25G X 1.5" (BLUE)

## (undated) DEVICE — MARKING PEN WRITESITE PLUS

## (undated) DEVICE — DRSG WEBRIL 4"

## (undated) DEVICE — SPONGE PVP PAINT SPONGE STICKS

## (undated) DEVICE — SUT POLYSORB 3-0 18" C-13 UNDYED

## (undated) DEVICE — SOL IRR POUR NS 0.9% 500ML

## (undated) DEVICE — DRILL BIT SYNTHES ORTHO QC 2X100MM

## (undated) DEVICE — GOWN TRIMAX XXL

## (undated) DEVICE — DRILL BIT SYNTHES ORTHO QC 2.7X100MM

## (undated) DEVICE — ELCTR BOVIE TIP BLADE INSULATED 2.75" EDGE

## (undated) DEVICE — TOURNIQUET CUFF 18" DUAL PORT SINGLE BLADDER W PLC  (BLACK)

## (undated) DEVICE — BLADE SCALPEL SAFETYLOCK #15

## (undated) DEVICE — SUT SURGIPRO II 5-0 24" CV-11 DA

## (undated) DEVICE — SAW BLADE CONMED LINVATEC RECIPROCATING CROSS CUT SMALL 5.5 X 9.5MM

## (undated) DEVICE — ELCTR GROUNDING PAD ADULT COVIDIEN

## (undated) DEVICE — SAW BLADE MICROAIRE SAGITTAL 9.4MMX25.4MMX0.6MM

## (undated) DEVICE — DRSG STOCKINETTE IMPERVIOUS XL

## (undated) DEVICE — STRYKER INTERPULSE HANDPIECE W IRR SUCTION TUBE

## (undated) DEVICE — DRAPE TOWEL BLUE 17" X 24"

## (undated) DEVICE — GLV 7.5 PROTEXIS (WHITE)

## (undated) DEVICE — PACK LOWER EXTREMITY

## (undated) DEVICE — DRSG ADAPTIC 3 X 8"

## (undated) DEVICE — DRAPE INSTRUMENT POUCH 6.75" X 11"

## (undated) DEVICE — SUT MONOSOF 4-0 18" C-13

## (undated) DEVICE — DRILL BIT SYNTHES ORTHO QC 1.5X85MM

## (undated) DEVICE — SUT NYLON 2-0 18" FS

## (undated) DEVICE — GOWN LG W TOWEL

## (undated) DEVICE — VENODYNE/SCD SLEEVE CALF MEDIUM

## (undated) DEVICE — GLV 6 PROTEXIS (WHITE)

## (undated) DEVICE — POSITIONER FOAM HEAD CRADLE (PINK)

## (undated) DEVICE — PREP CHLORAPREP HI-LITE ORANGE 26ML

## (undated) DEVICE — WARMING BLANKET UPPER ADULT

## (undated) DEVICE — GLV 7.5 PROTEXIS (BLUE)

## (undated) DEVICE — SUT POLYSORB 4-0 30" C-13 UNDYED

## (undated) DEVICE — SUT MONOSOF 5-0 18" C-13

## (undated) DEVICE — SYR LUER LOK 10CC